# Patient Record
Sex: MALE | Race: WHITE | NOT HISPANIC OR LATINO
[De-identification: names, ages, dates, MRNs, and addresses within clinical notes are randomized per-mention and may not be internally consistent; named-entity substitution may affect disease eponyms.]

---

## 2016-05-11 RX ORDER — GABAPENTIN 400 MG/1
1 CAPSULE ORAL
Qty: 0 | Refills: 0 | COMMUNITY
Start: 2016-05-11 | End: 2016-06-10

## 2017-01-04 ENCOUNTER — CLINICAL ADVICE (OUTPATIENT)
Age: 82
End: 2017-01-04

## 2017-01-05 ENCOUNTER — LABORATORY RESULT (OUTPATIENT)
Age: 82
End: 2017-01-05

## 2017-01-06 LAB
APPEARANCE: ABNORMAL
BILIRUBIN URINE: NEGATIVE
BLOOD URINE: ABNORMAL
COLOR: ABNORMAL
GLUCOSE QUALITATIVE U: NORMAL
KETONES URINE: NEGATIVE
LEUKOCYTE ESTERASE URINE: ABNORMAL
NITRITE URINE: NEGATIVE
PH URINE: 6
PROTEIN URINE: 300
SPECIFIC GRAVITY URINE: 1.03
UROBILINOGEN URINE: NORMAL

## 2017-01-09 ENCOUNTER — MEDICATION RENEWAL (OUTPATIENT)
Age: 82
End: 2017-01-09

## 2017-01-10 ENCOUNTER — APPOINTMENT (OUTPATIENT)
Dept: HOME HEALTH SERVICES | Facility: HOME HEALTH | Age: 82
End: 2017-01-10

## 2017-01-10 VITALS
DIASTOLIC BLOOD PRESSURE: 60 MMHG | SYSTOLIC BLOOD PRESSURE: 140 MMHG | HEART RATE: 72 BPM | RESPIRATION RATE: 16 BRPM | OXYGEN SATURATION: 94 % | TEMPERATURE: 97.8 F

## 2017-02-06 ENCOUNTER — APPOINTMENT (OUTPATIENT)
Dept: HOME HEALTH SERVICES | Facility: HOME HEALTH | Age: 82
End: 2017-02-06

## 2017-02-08 VITALS
TEMPERATURE: 97.4 F | HEART RATE: 72 BPM | RESPIRATION RATE: 16 BRPM | DIASTOLIC BLOOD PRESSURE: 70 MMHG | OXYGEN SATURATION: 96 % | SYSTOLIC BLOOD PRESSURE: 110 MMHG

## 2017-02-17 ENCOUNTER — APPOINTMENT (OUTPATIENT)
Dept: HOME HEALTH SERVICES | Facility: HOME HEALTH | Age: 82
End: 2017-02-17

## 2017-02-17 VITALS
OXYGEN SATURATION: 96 % | HEART RATE: 60 BPM | DIASTOLIC BLOOD PRESSURE: 70 MMHG | TEMPERATURE: 96.5 F | RESPIRATION RATE: 16 BRPM | SYSTOLIC BLOOD PRESSURE: 125 MMHG

## 2017-02-17 DIAGNOSIS — Z86.69 PERSONAL HISTORY OF OTHER DISEASES OF THE NERVOUS SYSTEM AND SENSE ORGANS: ICD-10-CM

## 2017-03-03 ENCOUNTER — LABORATORY RESULT (OUTPATIENT)
Age: 82
End: 2017-03-03

## 2017-03-03 ENCOUNTER — CLINICAL ADVICE (OUTPATIENT)
Age: 82
End: 2017-03-03

## 2017-03-06 ENCOUNTER — CLINICAL ADVICE (OUTPATIENT)
Age: 82
End: 2017-03-06

## 2017-03-13 ENCOUNTER — APPOINTMENT (OUTPATIENT)
Dept: HOME HEALTH SERVICES | Facility: HOME HEALTH | Age: 82
End: 2017-03-13

## 2017-03-13 VITALS
HEART RATE: 65 BPM | SYSTOLIC BLOOD PRESSURE: 120 MMHG | DIASTOLIC BLOOD PRESSURE: 70 MMHG | TEMPERATURE: 97.5 F | OXYGEN SATURATION: 97 % | RESPIRATION RATE: 16 BRPM

## 2017-03-29 ENCOUNTER — EMERGENCY (EMERGENCY)
Facility: HOSPITAL | Age: 82
LOS: 1 days | Discharge: HOME | End: 2017-03-29
Attending: EMERGENCY MEDICINE | Admitting: EMERGENCY MEDICINE
Payer: MEDICARE

## 2017-03-29 VITALS
OXYGEN SATURATION: 98 % | RESPIRATION RATE: 19 BRPM | HEART RATE: 98 BPM | DIASTOLIC BLOOD PRESSURE: 80 MMHG | SYSTOLIC BLOOD PRESSURE: 165 MMHG

## 2017-03-29 DIAGNOSIS — Z98.89 OTHER SPECIFIED POSTPROCEDURAL STATES: Chronic | ICD-10-CM

## 2017-03-29 DIAGNOSIS — Z95.0 PRESENCE OF CARDIAC PACEMAKER: Chronic | ICD-10-CM

## 2017-03-29 LAB
APTT BLD: 31.6 SEC — SIGNIFICANT CHANGE UP (ref 27.5–37.4)
BASOPHILS # BLD AUTO: 0.02 K/UL — SIGNIFICANT CHANGE UP (ref 0–0.2)
BASOPHILS NFR BLD AUTO: 0.2 % — SIGNIFICANT CHANGE UP (ref 0–2)
BUN SERPL-MCNC: 15 MG/DL — SIGNIFICANT CHANGE UP (ref 7–23)
CALCIUM SERPL-MCNC: 9.1 MG/DL — SIGNIFICANT CHANGE UP (ref 8.4–10.5)
CHLORIDE SERPL-SCNC: 96 MMOL/L — LOW (ref 98–107)
CO2 SERPL-SCNC: 25 MMOL/L — SIGNIFICANT CHANGE UP (ref 22–31)
CREAT SERPL-MCNC: 0.66 MG/DL — SIGNIFICANT CHANGE UP (ref 0.5–1.3)
EOSINOPHIL # BLD AUTO: 0.16 K/UL — SIGNIFICANT CHANGE UP (ref 0–0.5)
EOSINOPHIL NFR BLD AUTO: 1.7 % — SIGNIFICANT CHANGE UP (ref 0–6)
GLUCOSE SERPL-MCNC: 118 MG/DL — HIGH (ref 70–99)
HCT VFR BLD CALC: 41.4 % — SIGNIFICANT CHANGE UP (ref 39–50)
HGB BLD-MCNC: 13.6 G/DL — SIGNIFICANT CHANGE UP (ref 13–17)
IMM GRANULOCYTES NFR BLD AUTO: 2 % — HIGH (ref 0–1.5)
INR BLD: 1.1 — SIGNIFICANT CHANGE UP (ref 0.88–1.17)
LYMPHOCYTES # BLD AUTO: 2.07 K/UL — SIGNIFICANT CHANGE UP (ref 1–3.3)
LYMPHOCYTES # BLD AUTO: 21.8 % — SIGNIFICANT CHANGE UP (ref 13–44)
MCHC RBC-ENTMCNC: 29.3 PG — SIGNIFICANT CHANGE UP (ref 27–34)
MCHC RBC-ENTMCNC: 32.9 % — SIGNIFICANT CHANGE UP (ref 32–36)
MCV RBC AUTO: 89.2 FL — SIGNIFICANT CHANGE UP (ref 80–100)
MONOCYTES # BLD AUTO: 1.04 K/UL — HIGH (ref 0–0.9)
MONOCYTES NFR BLD AUTO: 10.9 % — SIGNIFICANT CHANGE UP (ref 2–14)
NEUTROPHILS # BLD AUTO: 6.03 K/UL — SIGNIFICANT CHANGE UP (ref 1.8–7.4)
NEUTROPHILS NFR BLD AUTO: 63.4 % — SIGNIFICANT CHANGE UP (ref 43–77)
PLATELET # BLD AUTO: 115 K/UL — LOW (ref 150–400)
PMV BLD: 9.9 FL — SIGNIFICANT CHANGE UP (ref 7–13)
POTASSIUM SERPL-MCNC: 4.9 MMOL/L — SIGNIFICANT CHANGE UP (ref 3.5–5.3)
POTASSIUM SERPL-SCNC: 4.9 MMOL/L — SIGNIFICANT CHANGE UP (ref 3.5–5.3)
PROTHROM AB SERPL-ACNC: 12.3 SEC — SIGNIFICANT CHANGE UP (ref 9.8–13.1)
RBC # BLD: 4.64 M/UL — SIGNIFICANT CHANGE UP (ref 4.2–5.8)
RBC # FLD: 15.1 % — HIGH (ref 10.3–14.5)
SODIUM SERPL-SCNC: 136 MMOL/L — SIGNIFICANT CHANGE UP (ref 135–145)
WBC # BLD: 9.51 K/UL — SIGNIFICANT CHANGE UP (ref 3.8–10.5)
WBC # FLD AUTO: 9.51 K/UL — SIGNIFICANT CHANGE UP (ref 3.8–10.5)

## 2017-03-29 PROCEDURE — 99283 EMERGENCY DEPT VISIT LOW MDM: CPT | Mod: 25,GC

## 2017-03-29 PROCEDURE — 30901 CONTROL OF NOSEBLEED: CPT | Mod: LT

## 2017-03-29 NOTE — ED PROVIDER NOTE - OBJECTIVE STATEMENT
86yo male with multiple medical problems including Aortic Stenosis, ICH s/p craniotomy with resultant seizures, controlled on depakote, recurrent c. diff, chronic indwelling arauz presents with epistaxis. Per daughter, patient had epistaxis last night which they placed packing in this am the packing had small amt of blood on it and bleeding had stopped. Pt about 1.5 hours prior to presentation with epistaxis with clots and coughing some up, unable to stop it. Patient denies abd pain, n/v/d. No fevers at home. Increased urinary sediment with decreased output in arauz per daughter. States his BP has been fluctuating from lows of systolics 90s to 180s past couple of days 86yo male with multiple medical problems including Aortic Stenosis, ICH s/p craniotomy with resultant seizures, controlled on depakote, recurrent c. diff, chronic indwelling arauz presents with epistaxis. Per daughter, patient had epistaxis last night which they placed packing in this am the packing had small amt of blood on it and bleeding had stopped. Pt about 1.5 hours prior to presentation with epistaxis with clots and coughing some up, unable to stop it. Patient denies abd pain, n/v/d. No fevers at home. Increased urinary sediment with decreased output in arauz per daughter. States his BP has been fluctuating from lows of systolics 90s to 180s past couple of days.   85M hx cva, ppm, on baby asa daily p/w epistaxis x 3 days. 86yo male with multiple medical problems including Aortic Stenosis, ICH s/p craniotomy with resultant seizures, controlled on depakote, recurrent c. diff, chronic indwelling arauz presents with epistaxis. Per daughter, patient had epistaxis last night which they placed packing in this am the packing had small amt of blood on it and bleeding had stopped. Pt about 1.5 hours prior to presentation with epistaxis with clots and coughing some up, unable to stop it. Patient denies abd pain, n/v/d. No fevers at home. Increased urinary sediment with decreased output in arauz per daughter. States his BP has been fluctuating from lows of systolics 90s to 180s past couple of days.   85M hx cva, ppm, on baby asa daily p/w epistaxis x 3 days. Patient aaox1, 84yo male with multiple medical problems including Aortic Stenosis, ICH s/p craniotomy with resultant seizures, controlled on depakote, recurrent c. diff, chronic indwelling arauz presents with epistaxis. Per daughter, patient had epistaxis last night which they placed packing in this am the packing had small amt of blood on it and bleeding had stopped. Pt about 1.5 hours prior to presentation with epistaxis with clots and coughing some up, unable to stop it. Patient denies abd pain, n/v/d. No fevers at home. Increased urinary sediment with decreased output in arauz per daughter. States his BP has been fluctuating from lows of systolics 90s to 180s past couple of days.   85M hx cva, ppm, on baby asa daily p/w epistaxis x 3 days. Patient aaox1, hpi as per daughter at bedside. Past three days intermitt epistaxis, improves with tamponade. Today continuous epistaxis despite tamponade, coughing up blood, neg sob neg wheeze. Daughter notes patient has had multiple epistaxis in past, has had rhinorockets had had arauz. Denies loc, lighthead, change in mental status. 86yo male with multiple medical problems including Aortic Stenosis, ICH s/p craniotomy with resultant seizures, controlled on depakote, recurrent c. diff, chronic indwelling arauz presents with epistaxis. Per daughter, patient had epistaxis last night which they placed packing in this am the packing had small amt of blood on it and bleeding had stopped. Pt about 1.5 hours prior to presentation with epistaxis with clots and coughing some up, unable to stop it. Patient denies abd pain, n/v/d. No fevers at home. Increased urinary sediment with decreased output in arauz per daughter. States his BP has been fluctuating from lows of systolics 90s to 180s past couple of days.   Jane attM hx cva, ppm, on baby asa daily p/w epistaxis x 3 days. Patient aaox1, hpi as per daughter at bedside. Past three days intermitt epistaxis, improves with tamponade. Today continuous epistaxis despite tamponade, coughing up blood, neg sob neg wheeze. Daughter notes patient has had multiple epistaxis in past, has had rhinorockets had had arauz. Denies loc, lighthead, change in mental status.

## 2017-03-29 NOTE — ED ADULT NURSE NOTE - OBJECTIVE STATEMENT
85 years old male presents with complaints of epistaxis that started today after leaning forward. Patient has dimilar prior episodes in the past. Patient only takes aspirin. Denies fever , chills, dizziness, syncope. On arrival, patient is bought in by ambulance accompanied by daughter, alert and oriented x 3, PERRLA, moderate blood noted coming from nosehard of hearing, lungs are clear bilaterally, S1, S2 regular, abdomen soft and nontender, no edema noted skin is intact. 20 gauge saline lock placed on left metacarpal by EMT. Blood drawn and sent. Will follow up and monitor. 85 years old male presents with complaints of epistaxis that started today after leaning forward. Patient has dimilar prior episodes in the past. Patient only takes aspirin. Denies fever , chills, dizziness, syncope. On arrival, patient is bought in by ambulance accompanied by daughter, alert and oriented x 3, PERRLA, moderate blood noted coming from nosehard of hearing, lungs are clear bilaterally, S1, S2 regular, abdomen soft and nontender, arauz 14 Fr in place, draining yellow urine, no edema noted skin is intact. 20 gauge saline lock placed on left metacarpal by EMT. Blood drawn and sent. Will follow up and monitor.

## 2017-03-29 NOTE — ED PROVIDER NOTE - PHYSICAL EXAMINATION
Jane att: elderly male, nad, aaox1-2, perrl, ctabl, rrr, s1s2, abd soft ntnd, oropharynx clear mallampati 1, lt ant nare vis bleed, neg septal hematoma

## 2017-03-29 NOTE — ED PROVIDER NOTE - PMH
Afib    Aortic stenosis    Hemorrhagic stroke  1990s  HTN (hypertension)    Hypothyroid    Seizure disorder    Urinary retention

## 2017-03-29 NOTE — ED ADULT TRIAGE NOTE - CHIEF COMPLAINT QUOTE
states" Nose bleed on and off since Monday and getting worst now, patient is not on any blood thinners".

## 2017-03-30 ENCOUNTER — APPOINTMENT (OUTPATIENT)
Age: 82
End: 2017-03-30

## 2017-03-30 VITALS
SYSTOLIC BLOOD PRESSURE: 132 MMHG | TEMPERATURE: 97 F | HEART RATE: 74 BPM | OXYGEN SATURATION: 96 % | DIASTOLIC BLOOD PRESSURE: 70 MMHG | RESPIRATION RATE: 16 BRPM

## 2017-03-30 RX ORDER — NITROFURANTOIN (MONOHYDRATE/MACROCRYSTALS) 25; 75 MG/1; MG/1
100 CAPSULE ORAL
Qty: 14 | Refills: 0 | Status: COMPLETED | COMMUNITY
Start: 2017-01-09 | End: 2017-03-30

## 2017-04-10 ENCOUNTER — APPOINTMENT (OUTPATIENT)
Dept: HOME HEALTH SERVICES | Facility: HOME HEALTH | Age: 82
End: 2017-04-10

## 2017-04-10 VITALS
SYSTOLIC BLOOD PRESSURE: 118 MMHG | HEART RATE: 67 BPM | OXYGEN SATURATION: 96 % | DIASTOLIC BLOOD PRESSURE: 80 MMHG | RESPIRATION RATE: 16 BRPM | TEMPERATURE: 98.1 F

## 2017-04-20 ENCOUNTER — MEDICATION RENEWAL (OUTPATIENT)
Age: 82
End: 2017-04-20

## 2017-04-26 ENCOUNTER — MEDICATION RENEWAL (OUTPATIENT)
Age: 82
End: 2017-04-26

## 2017-04-26 RX ORDER — AZITHROMYCIN 500 MG/1
500 TABLET, FILM COATED ORAL DAILY
Qty: 3 | Refills: 0 | Status: DISCONTINUED | COMMUNITY
Start: 2017-04-03 | End: 2017-04-26

## 2017-04-27 ENCOUNTER — LABORATORY RESULT (OUTPATIENT)
Age: 82
End: 2017-04-27

## 2017-04-28 ENCOUNTER — CLINICAL ADVICE (OUTPATIENT)
Age: 82
End: 2017-04-28

## 2017-04-28 ENCOUNTER — MEDICATION RENEWAL (OUTPATIENT)
Age: 82
End: 2017-04-28

## 2017-04-30 LAB
C DIFF TOX GENS STL QL NAA+PROBE: NORMAL
CDIFF BY PCR: DETECTED

## 2017-05-01 ENCOUNTER — CLINICAL ADVICE (OUTPATIENT)
Age: 82
End: 2017-05-01

## 2017-05-01 ENCOUNTER — RX RENEWAL (OUTPATIENT)
Age: 82
End: 2017-05-01

## 2017-05-01 LAB — BACTERIA STL CULT: NORMAL

## 2017-05-03 LAB
APPEARANCE: ABNORMAL
BILIRUBIN URINE: NEGATIVE
BLOOD URINE: ABNORMAL
COLOR: YELLOW
GLUCOSE QUALITATIVE U: NORMAL
KETONES URINE: NEGATIVE
LEUKOCYTE ESTERASE URINE: ABNORMAL
NITRITE URINE: POSITIVE
PH URINE: 5.5
PROTEIN URINE: NEGATIVE
SPECIFIC GRAVITY URINE: 1.01
UROBILINOGEN URINE: NORMAL

## 2017-05-16 ENCOUNTER — APPOINTMENT (OUTPATIENT)
Dept: HOME HEALTH SERVICES | Facility: HOME HEALTH | Age: 82
End: 2017-05-16

## 2017-05-16 VITALS
HEART RATE: 70 BPM | SYSTOLIC BLOOD PRESSURE: 140 MMHG | OXYGEN SATURATION: 94 % | DIASTOLIC BLOOD PRESSURE: 70 MMHG | RESPIRATION RATE: 16 BRPM | TEMPERATURE: 98 F

## 2017-05-17 RX ORDER — METRONIDAZOLE 500 MG/1
500 TABLET ORAL 3 TIMES DAILY
Qty: 42 | Refills: 0 | Status: DISCONTINUED | COMMUNITY
Start: 2017-04-28 | End: 2017-04-28

## 2017-05-30 ENCOUNTER — MEDICATION RENEWAL (OUTPATIENT)
Age: 82
End: 2017-05-30

## 2017-06-03 ENCOUNTER — RX RENEWAL (OUTPATIENT)
Age: 82
End: 2017-06-03

## 2017-06-06 ENCOUNTER — APPOINTMENT (OUTPATIENT)
Dept: HOME HEALTH SERVICES | Facility: HOME HEALTH | Age: 82
End: 2017-06-06

## 2017-06-06 VITALS
OXYGEN SATURATION: 93 % | TEMPERATURE: 97.6 F | RESPIRATION RATE: 14 BRPM | DIASTOLIC BLOOD PRESSURE: 90 MMHG | SYSTOLIC BLOOD PRESSURE: 162 MMHG | HEART RATE: 66 BPM

## 2017-06-06 DIAGNOSIS — L60.0 INGROWING NAIL: ICD-10-CM

## 2017-06-06 DIAGNOSIS — N39.0 URINARY TRACT INFECTION, SITE NOT SPECIFIED: ICD-10-CM

## 2017-06-06 DIAGNOSIS — M70.21 OLECRANON BURSITIS, RIGHT ELBOW: ICD-10-CM

## 2017-06-06 RX ORDER — CEFDINIR 300 MG/1
300 CAPSULE ORAL
Qty: 14 | Refills: 0 | Status: COMPLETED | COMMUNITY
Start: 2017-05-01 | End: 2017-06-06

## 2017-06-07 ENCOUNTER — LABORATORY RESULT (OUTPATIENT)
Age: 82
End: 2017-06-07

## 2017-06-13 LAB
ALBUMIN SERPL ELPH-MCNC: 3.2 G/DL
ANION GAP SERPL CALC-SCNC: 16 MMOL/L
BASOPHILS # BLD AUTO: 0.02 K/UL
BASOPHILS NFR BLD AUTO: 0.2 %
BUN SERPL-MCNC: 12 MG/DL
CALCIUM SERPL-MCNC: 9.2 MG/DL
CHLORIDE SERPL-SCNC: 97 MMOL/L
CO2 SERPL-SCNC: 25 MMOL/L
CREAT SERPL-MCNC: 0.87 MG/DL
EOSINOPHIL # BLD AUTO: 0.12 K/UL
EOSINOPHIL NFR BLD AUTO: 1.2 %
GLUCOSE SERPL-MCNC: 73 MG/DL
HCT VFR BLD CALC: 42.9 %
HGB BLD-MCNC: 13.7 G/DL
IMM GRANULOCYTES NFR BLD AUTO: 0.9 %
LYMPHOCYTES # BLD AUTO: 2.19 K/UL
LYMPHOCYTES NFR BLD AUTO: 21.9 %
MAN DIFF?: NORMAL
MCHC RBC-ENTMCNC: 29.1 PG
MCHC RBC-ENTMCNC: 31.9 GM/DL
MCV RBC AUTO: 91.1 FL
MONOCYTES # BLD AUTO: 1.34 K/UL
MONOCYTES NFR BLD AUTO: 13.4 %
NEUTROPHILS # BLD AUTO: 6.25 K/UL
NEUTROPHILS NFR BLD AUTO: 62.4 %
NT-PROBNP SERPL-MCNC: 1978 PG/ML
PHOSPHATE SERPL-MCNC: 3 MG/DL
PLATELET # BLD AUTO: 95 K/UL
POTASSIUM SERPL-SCNC: 3.8 MMOL/L
RBC # BLD: 4.71 M/UL
RBC # FLD: 15.5 %
SODIUM SERPL-SCNC: 138 MMOL/L
WBC # FLD AUTO: 10.01 K/UL

## 2017-06-19 ENCOUNTER — RX RENEWAL (OUTPATIENT)
Age: 82
End: 2017-06-19

## 2017-06-21 ENCOUNTER — MEDICATION RENEWAL (OUTPATIENT)
Age: 82
End: 2017-06-21

## 2017-06-30 ENCOUNTER — APPOINTMENT (OUTPATIENT)
Dept: HOME HEALTH SERVICES | Facility: HOME HEALTH | Age: 82
End: 2017-06-30

## 2017-08-01 ENCOUNTER — CLINICAL ADVICE (OUTPATIENT)
Age: 82
End: 2017-08-01

## 2017-08-08 ENCOUNTER — MEDICATION RENEWAL (OUTPATIENT)
Age: 82
End: 2017-08-08

## 2017-08-18 ENCOUNTER — APPOINTMENT (OUTPATIENT)
Dept: HOME HEALTH SERVICES | Facility: HOME HEALTH | Age: 82
End: 2017-08-18

## 2017-08-18 VITALS
TEMPERATURE: 98.4 F | HEART RATE: 82 BPM | SYSTOLIC BLOOD PRESSURE: 124 MMHG | DIASTOLIC BLOOD PRESSURE: 70 MMHG | OXYGEN SATURATION: 95 %

## 2017-08-21 ENCOUNTER — MEDICATION RENEWAL (OUTPATIENT)
Age: 82
End: 2017-08-21

## 2017-08-28 ENCOUNTER — RX RENEWAL (OUTPATIENT)
Age: 82
End: 2017-08-28

## 2017-08-31 ENCOUNTER — APPOINTMENT (OUTPATIENT)
Dept: HOME HEALTH SERVICES | Facility: HOME HEALTH | Age: 82
End: 2017-08-31

## 2017-09-05 ENCOUNTER — RX RENEWAL (OUTPATIENT)
Age: 82
End: 2017-09-05

## 2017-09-18 ENCOUNTER — APPOINTMENT (OUTPATIENT)
Dept: HOME HEALTH SERVICES | Facility: HOME HEALTH | Age: 82
End: 2017-09-18
Payer: MEDICARE

## 2017-09-18 VITALS
RESPIRATION RATE: 16 BRPM | DIASTOLIC BLOOD PRESSURE: 78 MMHG | SYSTOLIC BLOOD PRESSURE: 126 MMHG | OXYGEN SATURATION: 96 % | HEART RATE: 78 BPM | TEMPERATURE: 97.6 F

## 2017-09-18 DIAGNOSIS — Z87.898 PERSONAL HISTORY OF OTHER SPECIFIED CONDITIONS: ICD-10-CM

## 2017-09-18 DIAGNOSIS — Z71.89 OTHER SPECIFIED COUNSELING: ICD-10-CM

## 2017-09-18 DIAGNOSIS — A04.72 ENTEROCOLITIS DUE TO CLOSTRIDIUM DIFFICILE, NOT SPECIFIED AS RECURRENT: ICD-10-CM

## 2017-09-18 DIAGNOSIS — R13.12 DYSPHAGIA, OROPHARYNGEAL PHASE: ICD-10-CM

## 2017-09-18 PROCEDURE — 99350 HOME/RES VST EST HIGH MDM 60: CPT

## 2017-09-18 RX ORDER — VANCOMYCIN HYDROCHLORIDE 125 MG/1
125 CAPSULE ORAL
Qty: 28 | Refills: 0 | Status: DISCONTINUED | COMMUNITY
Start: 2017-06-06 | End: 2017-09-18

## 2017-09-18 RX ORDER — CHLORHEXIDINE GLUCONATE 4 %
325 (65 FE) LIQUID (ML) TOPICAL TWICE DAILY
Qty: 60 | Refills: 1 | Status: DISCONTINUED | COMMUNITY
Start: 2017-03-30 | End: 2017-09-18

## 2017-09-19 PROBLEM — R13.12 OROPHARYNGEAL DYSPHAGIA: Status: ACTIVE | Noted: 2017-06-06

## 2017-09-25 ENCOUNTER — MEDICATION RENEWAL (OUTPATIENT)
Age: 82
End: 2017-09-25

## 2017-10-23 ENCOUNTER — RX RENEWAL (OUTPATIENT)
Age: 82
End: 2017-10-23

## 2017-10-30 ENCOUNTER — RX RENEWAL (OUTPATIENT)
Age: 82
End: 2017-10-30

## 2017-11-13 ENCOUNTER — APPOINTMENT (OUTPATIENT)
Dept: HOME HEALTH SERVICES | Facility: HOME HEALTH | Age: 82
End: 2017-11-13
Payer: MEDICARE

## 2017-11-13 VITALS
OXYGEN SATURATION: 96 % | DIASTOLIC BLOOD PRESSURE: 70 MMHG | SYSTOLIC BLOOD PRESSURE: 130 MMHG | RESPIRATION RATE: 16 BRPM | HEART RATE: 78 BPM | TEMPERATURE: 97.6 F

## 2017-11-13 DIAGNOSIS — I35.0 NONRHEUMATIC AORTIC (VALVE) STENOSIS: ICD-10-CM

## 2017-11-13 DIAGNOSIS — H61.23 IMPACTED CERUMEN, BILATERAL: ICD-10-CM

## 2017-11-13 PROCEDURE — 90662 IIV NO PRSV INCREASED AG IM: CPT

## 2017-11-13 PROCEDURE — G0008: CPT

## 2017-11-13 PROCEDURE — 99350 HOME/RES VST EST HIGH MDM 60: CPT | Mod: 25

## 2017-11-15 ENCOUNTER — RESULT REVIEW (OUTPATIENT)
Age: 82
End: 2017-11-15

## 2017-11-15 LAB
25(OH)D3 SERPL-MCNC: 34.5 NG/ML
ALBUMIN SERPL ELPH-MCNC: 3.3 G/DL
ALP BLD-CCNC: 56 U/L
ALT SERPL-CCNC: 29 U/L
ANION GAP SERPL CALC-SCNC: 18 MMOL/L
AST SERPL-CCNC: 70 U/L
BASOPHILS # BLD AUTO: 0.05 K/UL
BASOPHILS NFR BLD AUTO: 0.5 %
BILIRUB SERPL-MCNC: 0.4 MG/DL
BUN SERPL-MCNC: 13 MG/DL
CALCIUM SERPL-MCNC: 9.2 MG/DL
CHLORIDE SERPL-SCNC: 93 MMOL/L
CO2 SERPL-SCNC: 24 MMOL/L
CREAT SERPL-MCNC: 0.92 MG/DL
EOSINOPHIL # BLD AUTO: 0.11 K/UL
EOSINOPHIL NFR BLD AUTO: 1.1 %
GLUCOSE SERPL-MCNC: 113 MG/DL
HBA1C MFR BLD HPLC: 5.9 %
HCT VFR BLD CALC: 43.3 %
HGB BLD-MCNC: 14.2 G/DL
IMM GRANULOCYTES NFR BLD AUTO: 1.2 %
LYMPHOCYTES # BLD AUTO: 2.36 K/UL
LYMPHOCYTES NFR BLD AUTO: 24.5 %
MAN DIFF?: NORMAL
MCHC RBC-ENTMCNC: 30.2 PG
MCHC RBC-ENTMCNC: 32.8 GM/DL
MCV RBC AUTO: 92.1 FL
MONOCYTES # BLD AUTO: 1.3 K/UL
MONOCYTES NFR BLD AUTO: 13.5 %
NEUTROPHILS # BLD AUTO: 5.71 K/UL
NEUTROPHILS NFR BLD AUTO: 59.2 %
PLATELET # BLD AUTO: 111 K/UL
POTASSIUM SERPL-SCNC: 4.8 MMOL/L
PREALB SERPL NEPH-MCNC: 24 MG/DL
PROT SERPL-MCNC: 7.1 G/DL
RBC # BLD: 4.7 M/UL
RBC # FLD: 15.6 %
SODIUM SERPL-SCNC: 135 MMOL/L
TSH SERPL-ACNC: 8.17 UIU/ML
WBC # FLD AUTO: 9.65 K/UL

## 2017-11-21 ENCOUNTER — RX RENEWAL (OUTPATIENT)
Age: 82
End: 2017-11-21

## 2017-11-28 ENCOUNTER — LABORATORY RESULT (OUTPATIENT)
Age: 82
End: 2017-11-28

## 2017-11-28 ENCOUNTER — CLINICAL ADVICE (OUTPATIENT)
Age: 82
End: 2017-11-28

## 2017-11-29 ENCOUNTER — CLINICAL ADVICE (OUTPATIENT)
Age: 82
End: 2017-11-29

## 2017-11-29 LAB
APPEARANCE: ABNORMAL
BILIRUBIN URINE: NEGATIVE
BLOOD URINE: ABNORMAL
COLOR: YELLOW
GLUCOSE QUALITATIVE U: NEGATIVE
KETONES URINE: NEGATIVE
LEUKOCYTE ESTERASE URINE: ABNORMAL
NITRITE URINE: POSITIVE
PH URINE: 6.5
PROTEIN URINE: 30
SPECIFIC GRAVITY URINE: 1.01
UROBILINOGEN URINE: NEGATIVE

## 2017-11-30 ENCOUNTER — CLINICAL ADVICE (OUTPATIENT)
Age: 82
End: 2017-11-30

## 2017-12-03 ENCOUNTER — RX RENEWAL (OUTPATIENT)
Age: 82
End: 2017-12-03

## 2017-12-06 ENCOUNTER — MEDICATION RENEWAL (OUTPATIENT)
Age: 82
End: 2017-12-06

## 2017-12-08 ENCOUNTER — RX RENEWAL (OUTPATIENT)
Age: 82
End: 2017-12-08

## 2017-12-26 ENCOUNTER — RX RENEWAL (OUTPATIENT)
Age: 82
End: 2017-12-26

## 2018-01-02 ENCOUNTER — APPOINTMENT (OUTPATIENT)
Dept: HOME HEALTH SERVICES | Facility: HOME HEALTH | Age: 83
End: 2018-01-02
Payer: MEDICARE

## 2018-01-02 VITALS
SYSTOLIC BLOOD PRESSURE: 140 MMHG | HEART RATE: 76 BPM | RESPIRATION RATE: 16 BRPM | OXYGEN SATURATION: 96 % | DIASTOLIC BLOOD PRESSURE: 80 MMHG

## 2018-01-02 DIAGNOSIS — N40.1 BENIGN PROSTATIC HYPERPLASIA WITH LOWER URINARY TRACT SYMPMS: ICD-10-CM

## 2018-01-02 DIAGNOSIS — Z92.89 PERSONAL HISTORY OF OTHER MEDICAL TREATMENT: ICD-10-CM

## 2018-01-02 DIAGNOSIS — L21.9 SEBORRHEIC DERMATITIS, UNSPECIFIED: ICD-10-CM

## 2018-01-02 DIAGNOSIS — Z92.29 PERSONAL HISTORY OF OTHER DRUG THERAPY: ICD-10-CM

## 2018-01-02 DIAGNOSIS — I95.2 HYPOTENSION DUE TO DRUGS: ICD-10-CM

## 2018-01-02 DIAGNOSIS — R04.0 EPISTAXIS: ICD-10-CM

## 2018-01-02 DIAGNOSIS — E03.9 HYPOTHYROIDISM, UNSPECIFIED: ICD-10-CM

## 2018-01-02 DIAGNOSIS — I70.0 ATHEROSCLEROSIS OF AORTA: ICD-10-CM

## 2018-01-02 DIAGNOSIS — L08.9 LOCAL INFECTION OF THE SKIN AND SUBCUTANEOUS TISSUE, UNSPECIFIED: ICD-10-CM

## 2018-01-02 DIAGNOSIS — I48.0 PAROXYSMAL ATRIAL FIBRILLATION: ICD-10-CM

## 2018-01-02 DIAGNOSIS — I50.30 UNSPECIFIED DIASTOLIC (CONGESTIVE) HEART FAILURE: ICD-10-CM

## 2018-01-02 DIAGNOSIS — F03.90 UNSPECIFIED DEMENTIA W/OUT BEHAVIORAL DISTURBANCE: ICD-10-CM

## 2018-01-02 DIAGNOSIS — N13.8 BENIGN PROSTATIC HYPERPLASIA WITH LOWER URINARY TRACT SYMPMS: ICD-10-CM

## 2018-01-02 DIAGNOSIS — I10 ESSENTIAL (PRIMARY) HYPERTENSION: ICD-10-CM

## 2018-01-02 DIAGNOSIS — Z78.9 OTHER SPECIFIED HEALTH STATUS: ICD-10-CM

## 2018-01-02 PROCEDURE — 99350 HOME/RES VST EST HIGH MDM 60: CPT

## 2018-01-02 RX ORDER — SULFAMETHOXAZOLE AND TRIMETHOPRIM 800; 160 MG/1; MG/1
800-160 TABLET ORAL
Qty: 14 | Refills: 1 | Status: DISCONTINUED | COMMUNITY
Start: 2017-03-30 | End: 2018-01-02

## 2018-01-02 RX ORDER — KETOCONAZOLE 20.5 MG/ML
2 SHAMPOO, SUSPENSION TOPICAL
Qty: 1 | Refills: 6 | Status: DISCONTINUED | COMMUNITY
Start: 2018-01-02 | End: 2018-01-02

## 2018-01-04 PROBLEM — I95.2 HYPOTENSION DUE TO DRUGS: Status: ACTIVE | Noted: 2018-01-02

## 2018-01-04 PROBLEM — N40.1 BPH WITH OBSTRUCTION/LOWER URINARY TRACT SYMPTOMS: Status: ACTIVE | Noted: 2018-01-02

## 2018-01-04 PROBLEM — I70.0 AORTIC CALCIFICATION: Status: ACTIVE | Noted: 2017-11-13

## 2018-01-04 PROBLEM — R04.0 EPISTAXIS, RECURRENT: Status: ACTIVE | Noted: 2017-03-30

## 2018-01-04 PROBLEM — Z78.9 PATIENT INCAPABLE OF MAKING INFORMED DECISIONS: Status: ACTIVE | Noted: 2018-01-02

## 2018-01-09 ENCOUNTER — RX RENEWAL (OUTPATIENT)
Age: 83
End: 2018-01-09

## 2018-01-10 ENCOUNTER — CLINICAL ADVICE (OUTPATIENT)
Age: 83
End: 2018-01-10

## 2018-01-10 ENCOUNTER — RX RENEWAL (OUTPATIENT)
Age: 83
End: 2018-01-10

## 2018-01-10 LAB — TSH SERPL-ACNC: 6.03 UIU/ML

## 2018-01-19 ENCOUNTER — RX RENEWAL (OUTPATIENT)
Age: 83
End: 2018-01-19

## 2018-02-08 ENCOUNTER — INPATIENT (INPATIENT)
Facility: HOSPITAL | Age: 83
LOS: 7 days | Discharge: HOME HEALTH | End: 2018-02-16
Attending: INTERNAL MEDICINE | Admitting: INTERNAL MEDICINE
Payer: MEDICARE

## 2018-02-08 VITALS
HEART RATE: 77 BPM | OXYGEN SATURATION: 100 % | RESPIRATION RATE: 22 BRPM | DIASTOLIC BLOOD PRESSURE: 98 MMHG | SYSTOLIC BLOOD PRESSURE: 190 MMHG | TEMPERATURE: 97 F

## 2018-02-08 DIAGNOSIS — R33.9 RETENTION OF URINE, UNSPECIFIED: ICD-10-CM

## 2018-02-08 DIAGNOSIS — I48.0 PAROXYSMAL ATRIAL FIBRILLATION: ICD-10-CM

## 2018-02-08 DIAGNOSIS — Z29.9 ENCOUNTER FOR PROPHYLACTIC MEASURES, UNSPECIFIED: ICD-10-CM

## 2018-02-08 DIAGNOSIS — Z71.89 OTHER SPECIFIED COUNSELING: ICD-10-CM

## 2018-02-08 DIAGNOSIS — G40.909 EPILEPSY, UNSPECIFIED, NOT INTRACTABLE, WITHOUT STATUS EPILEPTICUS: ICD-10-CM

## 2018-02-08 DIAGNOSIS — I35.0 NONRHEUMATIC AORTIC (VALVE) STENOSIS: ICD-10-CM

## 2018-02-08 DIAGNOSIS — I10 ESSENTIAL (PRIMARY) HYPERTENSION: ICD-10-CM

## 2018-02-08 DIAGNOSIS — E03.9 HYPOTHYROIDISM, UNSPECIFIED: ICD-10-CM

## 2018-02-08 DIAGNOSIS — R06.02 SHORTNESS OF BREATH: ICD-10-CM

## 2018-02-08 DIAGNOSIS — Z98.89 OTHER SPECIFIED POSTPROCEDURAL STATES: Chronic | ICD-10-CM

## 2018-02-08 DIAGNOSIS — I50.33 ACUTE ON CHRONIC DIASTOLIC (CONGESTIVE) HEART FAILURE: ICD-10-CM

## 2018-02-08 DIAGNOSIS — Z98.890 OTHER SPECIFIED POSTPROCEDURAL STATES: Chronic | ICD-10-CM

## 2018-02-08 DIAGNOSIS — Z95.0 PRESENCE OF CARDIAC PACEMAKER: Chronic | ICD-10-CM

## 2018-02-08 LAB
ALBUMIN SERPL ELPH-MCNC: 3.6 G/DL — SIGNIFICANT CHANGE UP (ref 3.3–5)
ALP SERPL-CCNC: 67 U/L — SIGNIFICANT CHANGE UP (ref 40–120)
ALT FLD-CCNC: 25 U/L — SIGNIFICANT CHANGE UP (ref 4–41)
APPEARANCE UR: SIGNIFICANT CHANGE UP
APTT BLD: 28.7 SEC — SIGNIFICANT CHANGE UP (ref 27.5–37.4)
AST SERPL-CCNC: 70 U/L — HIGH (ref 4–40)
B PERT DNA SPEC QL NAA+PROBE: SIGNIFICANT CHANGE UP
BACTERIA # UR AUTO: SIGNIFICANT CHANGE UP
BASE EXCESS BLDV CALC-SCNC: 2.9 MMOL/L — SIGNIFICANT CHANGE UP
BASOPHILS # BLD AUTO: 0.07 K/UL — SIGNIFICANT CHANGE UP (ref 0–0.2)
BASOPHILS NFR BLD AUTO: 0.6 % — SIGNIFICANT CHANGE UP (ref 0–2)
BILIRUB SERPL-MCNC: 0.7 MG/DL — SIGNIFICANT CHANGE UP (ref 0.2–1.2)
BILIRUB UR-MCNC: NEGATIVE — SIGNIFICANT CHANGE UP
BLOOD GAS VENOUS - CREATININE: 0.68 MG/DL — SIGNIFICANT CHANGE UP (ref 0.5–1.3)
BLOOD UR QL VISUAL: HIGH
BUN SERPL-MCNC: 18 MG/DL — SIGNIFICANT CHANGE UP (ref 7–23)
C PNEUM DNA SPEC QL NAA+PROBE: NOT DETECTED — SIGNIFICANT CHANGE UP
CALCIUM SERPL-MCNC: 8.9 MG/DL — SIGNIFICANT CHANGE UP (ref 8.4–10.5)
CHLORIDE BLDV-SCNC: 101 MMOL/L — SIGNIFICANT CHANGE UP (ref 96–108)
CHLORIDE SERPL-SCNC: 94 MMOL/L — LOW (ref 98–107)
CK MB BLD-MCNC: 5.41 NG/ML — SIGNIFICANT CHANGE UP (ref 1–6.6)
CK MB BLD-MCNC: 6.39 NG/ML — SIGNIFICANT CHANGE UP (ref 1–6.6)
CK SERPL-CCNC: 115 U/L — SIGNIFICANT CHANGE UP (ref 30–200)
CK SERPL-CCNC: 87 U/L — SIGNIFICANT CHANGE UP (ref 30–200)
CO2 SERPL-SCNC: 26 MMOL/L — SIGNIFICANT CHANGE UP (ref 22–31)
COLOR SPEC: YELLOW — SIGNIFICANT CHANGE UP
CREAT SERPL-MCNC: 0.93 MG/DL — SIGNIFICANT CHANGE UP (ref 0.5–1.3)
EOSINOPHIL # BLD AUTO: 0.05 K/UL — SIGNIFICANT CHANGE UP (ref 0–0.5)
EOSINOPHIL NFR BLD AUTO: 0.4 % — SIGNIFICANT CHANGE UP (ref 0–6)
FLUAV H1 2009 PAND RNA SPEC QL NAA+PROBE: NOT DETECTED — SIGNIFICANT CHANGE UP
FLUAV H1 RNA SPEC QL NAA+PROBE: NOT DETECTED — SIGNIFICANT CHANGE UP
FLUAV H3 RNA SPEC QL NAA+PROBE: NOT DETECTED — SIGNIFICANT CHANGE UP
FLUAV SUBTYP SPEC NAA+PROBE: SIGNIFICANT CHANGE UP
FLUBV RNA SPEC QL NAA+PROBE: NOT DETECTED — SIGNIFICANT CHANGE UP
GAS PNL BLDV: 131 MMOL/L — LOW (ref 136–146)
GLUCOSE BLDV-MCNC: 170 — HIGH (ref 70–99)
GLUCOSE SERPL-MCNC: 164 MG/DL — HIGH (ref 70–99)
GLUCOSE UR-MCNC: NEGATIVE — SIGNIFICANT CHANGE UP
HADV DNA SPEC QL NAA+PROBE: NOT DETECTED — SIGNIFICANT CHANGE UP
HCO3 BLDV-SCNC: 26 MMOL/L — SIGNIFICANT CHANGE UP (ref 20–27)
HCOV 229E RNA SPEC QL NAA+PROBE: NOT DETECTED — SIGNIFICANT CHANGE UP
HCOV HKU1 RNA SPEC QL NAA+PROBE: NOT DETECTED — SIGNIFICANT CHANGE UP
HCOV NL63 RNA SPEC QL NAA+PROBE: NOT DETECTED — SIGNIFICANT CHANGE UP
HCOV OC43 RNA SPEC QL NAA+PROBE: NOT DETECTED — SIGNIFICANT CHANGE UP
HCT VFR BLD CALC: 44.4 % — SIGNIFICANT CHANGE UP (ref 39–50)
HCT VFR BLDV CALC: 44.1 % — SIGNIFICANT CHANGE UP (ref 39–51)
HGB BLD-MCNC: 14.4 G/DL — SIGNIFICANT CHANGE UP (ref 13–17)
HGB BLDV-MCNC: 14.4 G/DL — SIGNIFICANT CHANGE UP (ref 13–17)
HMPV RNA SPEC QL NAA+PROBE: NOT DETECTED — SIGNIFICANT CHANGE UP
HPIV1 RNA SPEC QL NAA+PROBE: NOT DETECTED — SIGNIFICANT CHANGE UP
HPIV2 RNA SPEC QL NAA+PROBE: NOT DETECTED — SIGNIFICANT CHANGE UP
HPIV3 RNA SPEC QL NAA+PROBE: NOT DETECTED — SIGNIFICANT CHANGE UP
HPIV4 RNA SPEC QL NAA+PROBE: NOT DETECTED — SIGNIFICANT CHANGE UP
IMM GRANULOCYTES # BLD AUTO: 0.11 # — SIGNIFICANT CHANGE UP
IMM GRANULOCYTES NFR BLD AUTO: 0.9 % — SIGNIFICANT CHANGE UP (ref 0–1.5)
INR BLD: 1.02 — SIGNIFICANT CHANGE UP (ref 0.88–1.17)
KETONES UR-MCNC: NEGATIVE — SIGNIFICANT CHANGE UP
LACTATE BLDV-MCNC: 3.2 MMOL/L — HIGH (ref 0.5–2)
LEUKOCYTE ESTERASE UR-ACNC: HIGH
LYMPHOCYTES # BLD AUTO: 1.52 K/UL — SIGNIFICANT CHANGE UP (ref 1–3.3)
LYMPHOCYTES # BLD AUTO: 12.7 % — LOW (ref 13–44)
M PNEUMO DNA SPEC QL NAA+PROBE: NOT DETECTED — SIGNIFICANT CHANGE UP
MCHC RBC-ENTMCNC: 30.8 PG — SIGNIFICANT CHANGE UP (ref 27–34)
MCHC RBC-ENTMCNC: 32.4 % — SIGNIFICANT CHANGE UP (ref 32–36)
MCV RBC AUTO: 94.9 FL — SIGNIFICANT CHANGE UP (ref 80–100)
MONOCYTES # BLD AUTO: 1.15 K/UL — HIGH (ref 0–0.9)
MONOCYTES NFR BLD AUTO: 9.6 % — SIGNIFICANT CHANGE UP (ref 2–14)
MUCOUS THREADS # UR AUTO: SIGNIFICANT CHANGE UP
NEUTROPHILS # BLD AUTO: 9.03 K/UL — HIGH (ref 1.8–7.4)
NEUTROPHILS NFR BLD AUTO: 75.8 % — SIGNIFICANT CHANGE UP (ref 43–77)
NITRITE UR-MCNC: NEGATIVE — SIGNIFICANT CHANGE UP
NRBC # FLD: 0 — SIGNIFICANT CHANGE UP
NT-PROBNP SERPL-SCNC: 2159 PG/ML — SIGNIFICANT CHANGE UP
PCO2 BLDV: 51 MMHG — SIGNIFICANT CHANGE UP (ref 41–51)
PH BLDV: 7.36 PH — SIGNIFICANT CHANGE UP (ref 7.32–7.43)
PH UR: 6.5 — SIGNIFICANT CHANGE UP (ref 4.6–8)
PLATELET # BLD AUTO: 105 K/UL — LOW (ref 150–400)
PMV BLD: 10.5 FL — SIGNIFICANT CHANGE UP (ref 7–13)
PO2 BLDV: 60 MMHG — HIGH (ref 35–40)
POTASSIUM BLDV-SCNC: 4.3 MMOL/L — SIGNIFICANT CHANGE UP (ref 3.4–4.5)
POTASSIUM SERPL-MCNC: 5.4 MMOL/L — HIGH (ref 3.5–5.3)
POTASSIUM SERPL-SCNC: 5.4 MMOL/L — HIGH (ref 3.5–5.3)
PROT SERPL-MCNC: 8 G/DL — SIGNIFICANT CHANGE UP (ref 6–8.3)
PROT UR-MCNC: 100 MG/DL — SIGNIFICANT CHANGE UP
PROTHROM AB SERPL-ACNC: 11.7 SEC — SIGNIFICANT CHANGE UP (ref 9.8–13.1)
RBC # BLD: 4.68 M/UL — SIGNIFICANT CHANGE UP (ref 4.2–5.8)
RBC # FLD: 15 % — HIGH (ref 10.3–14.5)
RBC CASTS # UR COMP ASSIST: >50 — HIGH (ref 0–?)
RSV RNA SPEC QL NAA+PROBE: NOT DETECTED — SIGNIFICANT CHANGE UP
RV+EV RNA SPEC QL NAA+PROBE: NOT DETECTED — SIGNIFICANT CHANGE UP
SAO2 % BLDV: 88.7 % — HIGH (ref 60–85)
SODIUM SERPL-SCNC: 135 MMOL/L — SIGNIFICANT CHANGE UP (ref 135–145)
SP GR SPEC: 1.02 — SIGNIFICANT CHANGE UP (ref 1–1.04)
TROPONIN T SERPL-MCNC: 0.06 NG/ML — SIGNIFICANT CHANGE UP (ref 0–0.06)
TROPONIN T SERPL-MCNC: < 0.06 NG/ML — SIGNIFICANT CHANGE UP (ref 0–0.06)
UROBILINOGEN FLD QL: NORMAL MG/DL — SIGNIFICANT CHANGE UP
VALPROATE SERPL-MCNC: 95.8 UG/ML — SIGNIFICANT CHANGE UP (ref 50–100)
WBC # BLD: 11.93 K/UL — HIGH (ref 3.8–10.5)
WBC # FLD AUTO: 11.93 K/UL — HIGH (ref 3.8–10.5)
WBC UR QL: >50 — HIGH (ref 0–?)

## 2018-02-08 PROCEDURE — 71045 X-RAY EXAM CHEST 1 VIEW: CPT | Mod: 26

## 2018-02-08 PROCEDURE — 99497 ADVNCD CARE PLAN 30 MIN: CPT | Mod: 25

## 2018-02-08 PROCEDURE — 72193 CT PELVIS W/DYE: CPT | Mod: 26

## 2018-02-08 PROCEDURE — 99223 1ST HOSP IP/OBS HIGH 75: CPT

## 2018-02-08 RX ORDER — METOPROLOL TARTRATE 50 MG
25 TABLET ORAL
Qty: 0 | Refills: 0 | Status: DISCONTINUED | OUTPATIENT
Start: 2018-02-08 | End: 2018-02-16

## 2018-02-08 RX ORDER — ASCORBIC ACID 60 MG
1 TABLET,CHEWABLE ORAL
Qty: 0 | Refills: 0 | COMMUNITY

## 2018-02-08 RX ORDER — LATANOPROST 0.05 MG/ML
1 SOLUTION/ DROPS OPHTHALMIC; TOPICAL AT BEDTIME
Qty: 0 | Refills: 0 | Status: DISCONTINUED | OUTPATIENT
Start: 2018-02-08 | End: 2018-02-16

## 2018-02-08 RX ORDER — DIVALPROEX SODIUM 500 MG/1
500 TABLET, DELAYED RELEASE ORAL THREE TIMES A DAY
Qty: 0 | Refills: 0 | Status: DISCONTINUED | OUTPATIENT
Start: 2018-02-08 | End: 2018-02-16

## 2018-02-08 RX ORDER — FUROSEMIDE 40 MG
20 TABLET ORAL EVERY 12 HOURS
Qty: 0 | Refills: 0 | Status: DISCONTINUED | OUTPATIENT
Start: 2018-02-08 | End: 2018-02-12

## 2018-02-08 RX ORDER — CHOLECALCIFEROL (VITAMIN D3) 125 MCG
1000 CAPSULE ORAL DAILY
Qty: 0 | Refills: 0 | Status: DISCONTINUED | OUTPATIENT
Start: 2018-02-08 | End: 2018-02-16

## 2018-02-08 RX ORDER — LEVOTHYROXINE SODIUM 125 MCG
1 TABLET ORAL
Qty: 0 | Refills: 0 | COMMUNITY

## 2018-02-08 RX ORDER — DORZOLAMIDE HYDROCHLORIDE TIMOLOL MALEATE 20; 5 MG/ML; MG/ML
1 SOLUTION/ DROPS OPHTHALMIC
Qty: 0 | Refills: 0 | Status: DISCONTINUED | OUTPATIENT
Start: 2018-02-08 | End: 2018-02-16

## 2018-02-08 RX ORDER — SODIUM CHLORIDE 9 MG/ML
250 INJECTION INTRAMUSCULAR; INTRAVENOUS; SUBCUTANEOUS ONCE
Qty: 0 | Refills: 0 | Status: COMPLETED | OUTPATIENT
Start: 2018-02-08 | End: 2018-02-08

## 2018-02-08 RX ORDER — ASPIRIN/CALCIUM CARB/MAGNESIUM 324 MG
81 TABLET ORAL DAILY
Qty: 0 | Refills: 0 | Status: DISCONTINUED | OUTPATIENT
Start: 2018-02-08 | End: 2018-02-16

## 2018-02-08 RX ORDER — LATANOPROST 0.05 MG/ML
1 SOLUTION/ DROPS OPHTHALMIC; TOPICAL
Qty: 0 | Refills: 0 | COMMUNITY

## 2018-02-08 RX ORDER — SACCHAROMYCES BOULARDII 250 MG
1 POWDER IN PACKET (EA) ORAL
Qty: 0 | Refills: 0 | COMMUNITY

## 2018-02-08 RX ORDER — FUROSEMIDE 40 MG
40 TABLET ORAL ONCE
Qty: 0 | Refills: 0 | Status: COMPLETED | OUTPATIENT
Start: 2018-02-08 | End: 2018-02-08

## 2018-02-08 RX ORDER — ASCORBIC ACID 60 MG
500 TABLET,CHEWABLE ORAL DAILY
Qty: 0 | Refills: 0 | Status: DISCONTINUED | OUTPATIENT
Start: 2018-02-08 | End: 2018-02-16

## 2018-02-08 RX ORDER — DIVALPROEX SODIUM 500 MG/1
1 TABLET, DELAYED RELEASE ORAL
Qty: 0 | Refills: 0 | COMMUNITY

## 2018-02-08 RX ORDER — LEVOTHYROXINE SODIUM 125 MCG
112 TABLET ORAL DAILY
Qty: 0 | Refills: 0 | Status: DISCONTINUED | OUTPATIENT
Start: 2018-02-08 | End: 2018-02-16

## 2018-02-08 RX ORDER — GABAPENTIN 400 MG/1
300 CAPSULE ORAL THREE TIMES A DAY
Qty: 0 | Refills: 0 | Status: DISCONTINUED | OUTPATIENT
Start: 2018-02-08 | End: 2018-02-16

## 2018-02-08 RX ORDER — BRIMONIDINE TARTRATE 2 MG/MG
1 SOLUTION/ DROPS OPHTHALMIC
Qty: 0 | Refills: 0 | Status: DISCONTINUED | OUTPATIENT
Start: 2018-02-08 | End: 2018-02-16

## 2018-02-08 RX ORDER — RIVASTIGMINE 4.6 MG/24H
4.5 PATCH, EXTENDED RELEASE TRANSDERMAL
Qty: 0 | Refills: 0 | Status: DISCONTINUED | OUTPATIENT
Start: 2018-02-08 | End: 2018-02-16

## 2018-02-08 RX ORDER — CHOLECALCIFEROL (VITAMIN D3) 125 MCG
1 CAPSULE ORAL
Qty: 0 | Refills: 0 | COMMUNITY

## 2018-02-08 RX ORDER — BRIMONIDINE TARTRATE 2 MG/MG
1 SOLUTION/ DROPS OPHTHALMIC
Qty: 0 | Refills: 0 | COMMUNITY

## 2018-02-08 RX ORDER — MIDAZOLAM HYDROCHLORIDE 1 MG/ML
0.5 INJECTION, SOLUTION INTRAMUSCULAR; INTRAVENOUS ONCE
Qty: 0 | Refills: 0 | Status: DISCONTINUED | OUTPATIENT
Start: 2018-02-08 | End: 2018-02-08

## 2018-02-08 RX ORDER — TAMSULOSIN HYDROCHLORIDE 0.4 MG/1
0.4 CAPSULE ORAL AT BEDTIME
Qty: 0 | Refills: 0 | Status: DISCONTINUED | OUTPATIENT
Start: 2018-02-08 | End: 2018-02-16

## 2018-02-08 RX ADMIN — DIVALPROEX SODIUM 500 MILLIGRAM(S): 500 TABLET, DELAYED RELEASE ORAL at 13:19

## 2018-02-08 RX ADMIN — RIVASTIGMINE 4.5 MILLIGRAM(S): 4.6 PATCH, EXTENDED RELEASE TRANSDERMAL at 19:42

## 2018-02-08 RX ADMIN — Medication 40 MILLIGRAM(S): at 07:10

## 2018-02-08 RX ADMIN — LATANOPROST 1 DROP(S): 0.05 SOLUTION/ DROPS OPHTHALMIC; TOPICAL at 21:49

## 2018-02-08 RX ADMIN — DIVALPROEX SODIUM 500 MILLIGRAM(S): 500 TABLET, DELAYED RELEASE ORAL at 21:50

## 2018-02-08 RX ADMIN — DORZOLAMIDE HYDROCHLORIDE TIMOLOL MALEATE 1 DROP(S): 20; 5 SOLUTION/ DROPS OPHTHALMIC at 21:49

## 2018-02-08 RX ADMIN — Medication 25 MILLIGRAM(S): at 17:50

## 2018-02-08 RX ADMIN — BRIMONIDINE TARTRATE 1 DROP(S): 2 SOLUTION/ DROPS OPHTHALMIC at 21:48

## 2018-02-08 RX ADMIN — TAMSULOSIN HYDROCHLORIDE 0.4 MILLIGRAM(S): 0.4 CAPSULE ORAL at 21:51

## 2018-02-08 RX ADMIN — Medication 20 MILLIGRAM(S): at 17:50

## 2018-02-08 RX ADMIN — Medication 112 MICROGRAM(S): at 15:05

## 2018-02-08 RX ADMIN — MIDAZOLAM HYDROCHLORIDE 0.5 MILLIGRAM(S): 1 INJECTION, SOLUTION INTRAMUSCULAR; INTRAVENOUS at 06:30

## 2018-02-08 RX ADMIN — GABAPENTIN 300 MILLIGRAM(S): 400 CAPSULE ORAL at 21:53

## 2018-02-08 RX ADMIN — GABAPENTIN 300 MILLIGRAM(S): 400 CAPSULE ORAL at 13:53

## 2018-02-08 RX ADMIN — Medication 1000 UNIT(S): at 15:04

## 2018-02-08 RX ADMIN — SODIUM CHLORIDE 250 MILLILITER(S): 9 INJECTION INTRAMUSCULAR; INTRAVENOUS; SUBCUTANEOUS at 10:15

## 2018-02-08 RX ADMIN — Medication 1 TABLET(S): at 15:05

## 2018-02-08 RX ADMIN — Medication 500 MILLIGRAM(S): at 15:04

## 2018-02-08 RX ADMIN — Medication 81 MILLIGRAM(S): at 15:04

## 2018-02-08 NOTE — H&P ADULT - NEGATIVE GASTROINTESTINAL SYMPTOMS
no flatulence/no diarrhea/no vomiting/no change in bowel habits/no nausea/no melena/no constipation/no hematochezia

## 2018-02-08 NOTE — ED ADULT NURSE REASSESSMENT NOTE - NS ED NURSE REASSESS COMMENT FT1
received patient from night RN, a&o2-3, patient c/o pelvic "discomfort",  urine draining clear yellow, v/s stable, CM shows NS, waiting pelvis CT at this time.

## 2018-02-08 NOTE — ED ADULT NURSE REASSESSMENT NOTE - NS ED NURSE REASSESS COMMENT FT1
BiPAP placed, pt settings 10/5/60, tolerating well at this time.  Pt medicated per orders.  Awaiting urine outpt to arauz and CT w/ IV con.  Will CTM closely.

## 2018-02-08 NOTE — ED ADULT NURSE NOTE - OBJECTIVE STATEMENT
Pt rcvd to [facility] c/o difficulty breathing, per daughter pt had increased WOB, diaphoresis, and seemed very restless, abd distended c/o leaking around chronic indwelling arauz catheter tonight. PT is aaox2 (self/situation), [place], bilat hearing aids in place.  Pt received on nonrebreather o2sat 97%, pt trialed to 4L NC and desat to 90%, per dtr does not use home O2.  Pt is clammy, shirt soaked w/ sweat, tachypneic to 30, called for BiPAP.  IVL to L Hand 22g placed by EMS, 2nd IV placed to posterior R Arm #20g, labs/cultures/rvp sent per orders.  ED MD Hobbs at bedside for eval.  Pt rectally afebrile 99.2, skin w/ blanchable redness to sacrum, no breakdown noted.  Severe bipedal edema noted 4+ on L, 2+ on R, per dtr is baseline. Denies cp/sob/abd pain at this time.  Scrotum appearing reddened/inflammed - per daughter is change from yesterday. Noted indwelling 16coude urinary catheter w/ 400cc cloudy yellow urine in bag. DC'd arauz intact, replaced w/ 16 coude catheter, easily passed. ED MD Hobbs performed bedside bladder scan to confirm balloon placement.  Pt daughter & son @ bedside (are HCPs), information in chart.  No specific c/o fevers/chills/cough/viral symptoms at home per family.  Awaiting urine specimen collection and disposition.  Will CTM closely. Pt rcvd to [facility] c/o difficulty breathing, per daughter pt had increased WOB, diaphoresis, and seemed very restless, abd distended c/o leaking around chronic indwelling arauz catheter tonight. PT is aaox2 (self/situation), [place], bilat hearing aids in place.  Pt received on nonrebreather o2sat 97%, pt trialed to 4L NC and desat to 90%, per dtr does not use home O2.  Pt is clammy, shirt soaked w/ sweat, tachypneic to 30, HR AFib (historical) called for BiPAP.  IVL to L Hand 22g placed by EMS, 2nd IV placed to posterior R Arm #20g, labs/cultures/rvp sent per orders.  ED MD Hobbs at bedside for eval.  Pt rectally afebrile 99.2, skin w/ blanchable redness to sacrum, no breakdown noted.  Severe bipedal edema noted 4+ on L, 2+ on R, per dtr is baseline. Denies cp/sob/abd pain at this time.  Scrotum appearing reddened/inflammed - per daughter is change from yesterday. Noted indwelling 16coude urinary catheter w/ 400cc cloudy yellow urine in bag. DC'd arauz intact, replaced w/ 16 coude catheter, easily passed. ED MD Hobbs performed bedside bladder scan to confirm balloon placement.  PMHx AFib/ Pacemaker, HTN, Hypothyroid, Aortic Stenosis, Urinary Retention, Seizure disorder on Depakote. Pt daughter & son @ bedside (are HCPs), information in chart.  No specific c/o fevers/chills/cough/viral symptoms at home per family.  Awaiting urine specimen collection and disposition.  Will CTM closely.

## 2018-02-08 NOTE — ED PROVIDER NOTE - MUSCULOSKELETAL, MLM
bl 1+ pitting edema to lower extremities, Spine appears normal, range of motion is not limited, no muscle or joint tenderness

## 2018-02-08 NOTE — H&P ADULT - PMH
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

## 2018-02-08 NOTE — H&P ADULT - ASSESSMENT
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

## 2018-02-08 NOTE — H&P ADULT - NSHPPHYSICALEXAM_GEN_ALL_CORE
Vital Signs Last 24 Hrs  T(C): 37.3 (08 Feb 2018 06:49), Max: 37.3 (08 Feb 2018 06:49)  T(F): 99.2 (08 Feb 2018 06:49), Max: 99.2 (08 Feb 2018 06:49)  HR: 96 (08 Feb 2018 14:16) (70 - 110)  BP: 146/65 (08 Feb 2018 14:16) (88/56 - 190/98)  BP(mean): --  RR: 24 (08 Feb 2018 14:16) (20 - 30)  SpO2: 96% (08 Feb 2018 14:16) (94% - 100%)

## 2018-02-08 NOTE — ED PROVIDER NOTE - ATTENDING CONTRIBUTION TO CARE
87 y/o M with h/o AF on aspirin, AS, previous ICH with seizure disorder on depakote, chronic indwelling arauz here with difficulty breathing.  History provided by daughter, who reports pt with worsening shortness of breathing overnight tonight.  Pt was previously in his usual state of health until tonight.  No fever, chills, cp, back pain, abd pain, n/v/d.  Daughter also reports arauz was recently changed, pt c/o suprapubic pressure and urinary urgency.  Arauz with decreased output and noted to be leaking around the catheter and new scrotal redness.  Elderly male sitting comfortably in stretcher, awake and alert, nontoxic.  VSS, afeb, tachypneic.  Pt is speaking in short sentences, tachypneic, increased work of breathing and subcostal retractions.  Lungs cta with bibasilar rales to midlung field.  Cards nl S1/S2, RRR, no MRG.  Abd soft ntnd.  Trace bilat pedal edema.  (+)scrotum reddened, no crepitus or palpable fluctuance, no sacral ulcerations.  Plan for bipap, labs, cxr, diurese, admit.

## 2018-02-08 NOTE — ED CLERICAL - NS ED CLERK NOTE PRE-ARRIVAL INFORMATION; ADDITIONAL PRE-ARRIVAL INFORMATION

## 2018-02-08 NOTE — H&P ADULT - HISTORY OF PRESENT ILLNESS
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 Son and daughter at bedside for collateral information, though pt is a good historian. Pt is nearly bedbound but does ambulate with walker and with 2 person assistance. 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 Pt was feeling at his baseline yesterday and even when he went to bed last night. This morning while sleeping, pt woke up feeling very short of breath. Pt was sitting in a reclined position when he was sleeping as he has chronic orthopnea. Pt also admits to having lower extremity edema and dyspnea on exertion during the rare times that he does ambulate. Pt elicits that he has been feeling substantial "pressure" in his suprapubic region. Daughter reports they tried to make him comfortable overnight but he remained restless and short of breath until EMS was called at 4:30AM because she thought he has "wet breath sounds." Pt has a chronic arauz that was changed 7 days ago. He is followed by [facility]. Pt denies fever, chills, recent travel, headache, dizziness, visual deficits, chest pain, palpitations, N/V/D/C, hematochezia, melena, dysuria, hematuria, LOC, syncope. Upon arrival to ED, EKG revealed NSR at 77 bpm with sinus arrhythmia at 77 bpm with sinus arrhythmia, RBBB, LAD. First set of cardiac enzymes negative. CXR showed mild interstitial edema. CT pelvis shows, "No abscess or gas collection in the pelvis. Bilateral hydroceles." WBC 11.93. Platelets 105. Glucose 164. AST 70. ProBNP 2159. UA: Moderate blood, large LE. RVP: Negative. Pt was given Lasix and admitted to telemetry.

## 2018-02-08 NOTE — ED PROVIDER NOTE - MEDICAL DECISION MAKING DETAILS
sudden onset sob, noted to be diaphoretic, tachypneic, denying cp in pt with hx as->acs vs. chf exacerbation vs. fluid overload 2/2 worsened AS. CXR, EKG, CE  suprapubic tenderness, sensation of urinary urgency->ua, change arauz, r/o uti

## 2018-02-08 NOTE — H&P ADULT - RS GEN PE MLT RESP DETAILS PC
no wheezes/no rhonchi/no chest wall tenderness/no intercostal retractions/airway patent/rales/respirations non-labored

## 2018-02-08 NOTE — H&P ADULT - PROBLEM SELECTOR PLAN 9
Discussed advanced directives with patient's son and daughter who are the patient's HCPs, they confirmed the prior MOLST directives where patient is DNR/DNI. They understand their father is chronically ill and would have a poor outcome after aggressive resuscitation measures, and that the patient would not want to go through being coded and intubated on pressors again.    Time of Face to Face Discussion: 22 minutes

## 2018-02-08 NOTE — H&P ADULT - PROBLEM SELECTOR PLAN 1
Admit to telemetry, serial cardiac enzymes, serial EKGs  Acute heart failure likely in the setting of aortic stenosis as echo from 2016 shows normal LV function  Pt appears comfortable on oxygen S/P BIPAP in ED (tolerating O2 at this time)  Start Lasix 20mg IVP BID (pt became hypotensive with 40mg)  Strict I&Os, monitor daily weights, 1500 cc fluid restriction, sodium restriction  Echocardiogram ordered to assess LV and valvular function  Consider cardiology consult  Dietician consult  F/U MD note

## 2018-02-08 NOTE — H&P ADULT - ATTENDING COMMENTS
Agree with PA Note Above, edits made where appropriate, case discussed with PA    Patient seen and examined. This is an 86M being admitted for acute on chronic diastolic CHF, unclear triggering etiology. Patient currently with improved respiratory status, confirmed by family at bedside. No preceding fevers, chills, diarrhea, however patient noted to have URI symptoms (rhinorrhea) in the prior few days. The patient is only complaining now of soreness in buttocks 2/2 uncomfortable stretcher. There was remote history of suprapubic pressure, however now patient without complaint.   VS and PE as above   Labs, imaging, and EKG reviewed  A/P: 86M being admitted for acute on chronic diastolic congestive heart failure  1. Acute on Chronic Diastolic CHF - c/w IV BID Lasix, supplemental oxygen, admit to telemetry. Check Echocardiogram. Unclear etiology, possibly UTI, but will need to follow up cultures. Strict I's and O's, daily weights, and DASH diet. Consider Cardiology consultation   2. Moderate AS - check Echocardiogram to evaluate if there is progression of AS  3. Abnormal Urinalysis - Patient with chronic indwelling arauz, recently changed. Possible UTI however patient is asymptomatic at this point and has extensive history of C.diff from abx use for UTI. Given non-toxic appearance and no symptoms, would hold off for now but have low threshold to begin antibiotics if patient becomes symptomatic/acute change in clinical status  Rest of plan as above   Defer DVT ppx given hx of ICH Agree with PA Note Above, edits made where appropriate, case discussed with PA    Patient seen and examined. This is an 86M being admitted for acute on chronic diastolic CHF, unclear triggering etiology. Patient currently with improved respiratory status, confirmed by family at bedside. No preceding fevers, chills, diarrhea, however patient noted to have URI symptoms (rhinorrhea) in the prior few days. The patient is only complaining now of soreness in buttocks 2/2 uncomfortable stretcher. There was remote history of suprapubic pressure, however now patient without complaint.   VS and PE as above   Labs, imaging, and EKG reviewed  A/P: 86M being admitted for acute on chronic diastolic congestive heart failure  1. Acute on Chronic Diastolic CHF - c/w IV BID Lasix, supplemental oxygen, admit to telemetry. Check Echocardiogram. Unclear etiology, possibly UTI, but will need to follow up cultures. Strict I's and O's, daily weights, and DASH diet. Consider Cardiology consultation   2. Moderate AS - check Echocardiogram to evaluate if there is progression of AS  3. Abnormal Urinalysis - Patient with chronic indwelling arauz, recently changed. Possible UTI however patient is asymptomatic at this point and has extensive history of C.diff from abx use for UTI. Given non-toxic appearance and no symptoms, would hold off for now but have low threshold to begin antibiotics if patient becomes symptomatic/acute change in clinical status  4. Advanced Directives Discussion by me (see above) Face to Face Discussion 22 minutes   Rest of plan as above   Defer DVT ppx given hx of ICH

## 2018-02-08 NOTE — ED ADULT NURSE NOTE - CHIEF COMPLAINT QUOTE
Pt. with c/o difficulty breathing becoming increasing worst over the last two hours,  Pt. with hx of aortic stannosis, afib and pacemaker. Sat% 72%.  Pt. with indwelling arauz cath; changed last Thursday.

## 2018-02-08 NOTE — H&P ADULT - NSHPSOCIALHISTORY_GEN_ALL_CORE
. Lives at home with family (son/daughter).  Ambulates with 2 person assistance with walker.  HHA 7 days/week private hire.  Received flu vaccine and PNA vaccine.  Former smoker. Denies drinking.

## 2018-02-08 NOTE — ED ADULT TRIAGE NOTE - CHIEF COMPLAINT QUOTE
Pt. with c/o difficulty breathing becoming increasing worst over the last two hurt,  Pt. with hx of aortic stannosis, afib and pacemaker. Sat% 72%.  Pt. with indwelling arauz cath; changed last thursday. Pt. with c/o difficulty breathing becoming increasing worst over the last two hours,  Pt. with hx of aortic stannosis, afib and pacemaker. Sat% 72%.  Pt. with indwelling arauz cath; changed last Thursday.

## 2018-02-08 NOTE — ED ADULT NURSE REASSESSMENT NOTE - NS ED NURSE REASSESS COMMENT FT1
patient returns from CT scan, weened off BPAP, on 4L NC o2sat 95%, patient persistently hypotensive, mentating well, denies any complaints, MD Echols aware, 250mL bolus NS, BP higher at 116/57, will re assess and monitor closely.

## 2018-02-08 NOTE — ED PROVIDER NOTE - OBJECTIVE STATEMENT
86yM hx Aortic Stenosis, afib, pacemaker, ICH s/p craniotomy with resultant seizures, controlled on depakote, recurrent c. diff, chronic indwelling arauz (last changed Thurs) p/w sob progressively worsening x 2hrs. 86yM hx Aortic Stenosis, afib, pacemaker, ICH s/p craniotomy with resultant seizures, controlled on depakote, recurrent c. diff, chronic indwelling arauz (last changed Thurs) p/w sob progressively worsening x 2hrs. Per dtr (HCP) pt awoke with sensation of abd fullness/having to urinate. Went to btm and got back into bed and then began c/o sob that quickly worsened. Dtr noted sudden onset of audible wet breath sounds that developed as well during that time. Pt denies cp but endorses sensation of bladder fullness and having to urinate. Per dtr arauz last changed thursday but he was noted to be leaking around arauz today.

## 2018-02-08 NOTE — ED ADULT NURSE REASSESSMENT NOTE - NS ED NURSE REASSESS COMMENT FT1
Per ED MD Hobbs, not to administer IVF for pt lactate, pt is fluid overloaded.  No IV antibiotics ordered at this time, waiting on CT.

## 2018-02-08 NOTE — H&P ADULT - PROBLEM SELECTOR PLAN 2
Pt currently in NSR/sinus tachycardia  Continue to monitor on telemetry  Continue Metoprolol for rate control  Continue ASA for lifelong CVA prevention (pt cannot be on systemic A/C secondary to prior ICH)  CHADS score 5

## 2018-02-08 NOTE — H&P ADULT - NEUROLOGICAL DETAILS
alert and oriented x 3/sensation intact/normal strength/cranial nerves intact/responds to pain/responds to verbal commands

## 2018-02-09 DIAGNOSIS — R06.03 ACUTE RESPIRATORY DISTRESS: ICD-10-CM

## 2018-02-09 LAB
ANISOCYTOSIS BLD QL: SLIGHT — SIGNIFICANT CHANGE UP
BASOPHILS # BLD AUTO: 0.04 K/UL — SIGNIFICANT CHANGE UP (ref 0–0.2)
BASOPHILS NFR BLD AUTO: 0.5 % — SIGNIFICANT CHANGE UP (ref 0–2)
BASOPHILS NFR SPEC: 2 % — SIGNIFICANT CHANGE UP (ref 0–2)
BUN SERPL-MCNC: 19 MG/DL — SIGNIFICANT CHANGE UP (ref 7–23)
CALCIUM SERPL-MCNC: 8.6 MG/DL — SIGNIFICANT CHANGE UP (ref 8.4–10.5)
CHLORIDE SERPL-SCNC: 97 MMOL/L — LOW (ref 98–107)
CHOLEST SERPL-MCNC: 147 MG/DL — SIGNIFICANT CHANGE UP (ref 120–199)
CO2 SERPL-SCNC: 26 MMOL/L — SIGNIFICANT CHANGE UP (ref 22–31)
CREAT SERPL-MCNC: 0.86 MG/DL — SIGNIFICANT CHANGE UP (ref 0.5–1.3)
EOSINOPHIL # BLD AUTO: 0.11 K/UL — SIGNIFICANT CHANGE UP (ref 0–0.5)
EOSINOPHIL NFR BLD AUTO: 1.4 % — SIGNIFICANT CHANGE UP (ref 0–6)
EOSINOPHIL NFR FLD: 0 % — SIGNIFICANT CHANGE UP (ref 0–6)
GLUCOSE SERPL-MCNC: 80 MG/DL — SIGNIFICANT CHANGE UP (ref 70–99)
HBA1C BLD-MCNC: 5.9 % — HIGH (ref 4–5.6)
HCT VFR BLD CALC: 40.4 % — SIGNIFICANT CHANGE UP (ref 39–50)
HDLC SERPL-MCNC: 24 MG/DL — LOW (ref 35–55)
HGB BLD-MCNC: 13.4 G/DL — SIGNIFICANT CHANGE UP (ref 13–17)
IMM GRANULOCYTES # BLD AUTO: 0.06 # — SIGNIFICANT CHANGE UP
IMM GRANULOCYTES NFR BLD AUTO: 0.8 % — SIGNIFICANT CHANGE UP (ref 0–1.5)
LIPID PNL WITH DIRECT LDL SERPL: 95 MG/DL — SIGNIFICANT CHANGE UP
LYMPHOCYTES # BLD AUTO: 2.24 K/UL — SIGNIFICANT CHANGE UP (ref 1–3.3)
LYMPHOCYTES # BLD AUTO: 29.1 % — SIGNIFICANT CHANGE UP (ref 13–44)
LYMPHOCYTES NFR SPEC AUTO: 25 % — SIGNIFICANT CHANGE UP (ref 13–44)
MACROCYTES BLD QL: SLIGHT — SIGNIFICANT CHANGE UP
MAGNESIUM SERPL-MCNC: 2.1 MG/DL — SIGNIFICANT CHANGE UP (ref 1.6–2.6)
MANUAL SMEAR VERIFICATION: SIGNIFICANT CHANGE UP
MCHC RBC-ENTMCNC: 31.5 PG — SIGNIFICANT CHANGE UP (ref 27–34)
MCHC RBC-ENTMCNC: 33.2 % — SIGNIFICANT CHANGE UP (ref 32–36)
MCV RBC AUTO: 94.8 FL — SIGNIFICANT CHANGE UP (ref 80–100)
MONOCYTES # BLD AUTO: 1.08 K/UL — HIGH (ref 0–0.9)
MONOCYTES NFR BLD AUTO: 14 % — SIGNIFICANT CHANGE UP (ref 2–14)
MONOCYTES NFR BLD: 11 % — HIGH (ref 2–9)
NEUTROPHIL AB SER-ACNC: 57 % — SIGNIFICANT CHANGE UP (ref 43–77)
NEUTROPHILS # BLD AUTO: 4.16 K/UL — SIGNIFICANT CHANGE UP (ref 1.8–7.4)
NEUTROPHILS NFR BLD AUTO: 54.2 % — SIGNIFICANT CHANGE UP (ref 43–77)
NEUTS BAND # BLD: 2 % — SIGNIFICANT CHANGE UP (ref 0–6)
NRBC # BLD: 0 /100WBC — SIGNIFICANT CHANGE UP
NRBC # FLD: 0 — SIGNIFICANT CHANGE UP
PLATELET # BLD AUTO: 92 K/UL — LOW (ref 150–400)
PLATELET COUNT - ESTIMATE: SIGNIFICANT CHANGE UP
PMV BLD: 9.9 FL — SIGNIFICANT CHANGE UP (ref 7–13)
POTASSIUM SERPL-MCNC: 4.4 MMOL/L — SIGNIFICANT CHANGE UP (ref 3.5–5.3)
POTASSIUM SERPL-SCNC: 4.4 MMOL/L — SIGNIFICANT CHANGE UP (ref 3.5–5.3)
RBC # BLD: 4.26 M/UL — SIGNIFICANT CHANGE UP (ref 4.2–5.8)
RBC # FLD: 15.2 % — HIGH (ref 10.3–14.5)
SODIUM SERPL-SCNC: 137 MMOL/L — SIGNIFICANT CHANGE UP (ref 135–145)
SPECIMEN SOURCE: SIGNIFICANT CHANGE UP
TRIGL SERPL-MCNC: 136 MG/DL — SIGNIFICANT CHANGE UP (ref 10–149)
TSH SERPL-MCNC: 5.96 UIU/ML — HIGH (ref 0.27–4.2)
VARIANT LYMPHS # BLD: 3 % — SIGNIFICANT CHANGE UP
WBC # BLD: 7.69 K/UL — SIGNIFICANT CHANGE UP (ref 3.8–10.5)
WBC # FLD AUTO: 7.69 K/UL — SIGNIFICANT CHANGE UP (ref 3.8–10.5)

## 2018-02-09 PROCEDURE — 99233 SBSQ HOSP IP/OBS HIGH 50: CPT

## 2018-02-09 RX ADMIN — DORZOLAMIDE HYDROCHLORIDE TIMOLOL MALEATE 1 DROP(S): 20; 5 SOLUTION/ DROPS OPHTHALMIC at 21:21

## 2018-02-09 RX ADMIN — DIVALPROEX SODIUM 500 MILLIGRAM(S): 500 TABLET, DELAYED RELEASE ORAL at 05:54

## 2018-02-09 RX ADMIN — LATANOPROST 1 DROP(S): 0.05 SOLUTION/ DROPS OPHTHALMIC; TOPICAL at 21:22

## 2018-02-09 RX ADMIN — DORZOLAMIDE HYDROCHLORIDE TIMOLOL MALEATE 1 DROP(S): 20; 5 SOLUTION/ DROPS OPHTHALMIC at 05:51

## 2018-02-09 RX ADMIN — Medication 25 MILLIGRAM(S): at 17:07

## 2018-02-09 RX ADMIN — DIVALPROEX SODIUM 500 MILLIGRAM(S): 500 TABLET, DELAYED RELEASE ORAL at 21:23

## 2018-02-09 RX ADMIN — Medication 1000 UNIT(S): at 11:45

## 2018-02-09 RX ADMIN — RIVASTIGMINE 4.5 MILLIGRAM(S): 4.6 PATCH, EXTENDED RELEASE TRANSDERMAL at 05:52

## 2018-02-09 RX ADMIN — Medication 20 MILLIGRAM(S): at 05:53

## 2018-02-09 RX ADMIN — TAMSULOSIN HYDROCHLORIDE 0.4 MILLIGRAM(S): 0.4 CAPSULE ORAL at 21:23

## 2018-02-09 RX ADMIN — GABAPENTIN 300 MILLIGRAM(S): 400 CAPSULE ORAL at 05:53

## 2018-02-09 RX ADMIN — BRIMONIDINE TARTRATE 1 DROP(S): 2 SOLUTION/ DROPS OPHTHALMIC at 21:21

## 2018-02-09 RX ADMIN — Medication 20 MILLIGRAM(S): at 17:07

## 2018-02-09 RX ADMIN — GABAPENTIN 300 MILLIGRAM(S): 400 CAPSULE ORAL at 13:34

## 2018-02-09 RX ADMIN — Medication 1 TABLET(S): at 11:45

## 2018-02-09 RX ADMIN — BRIMONIDINE TARTRATE 1 DROP(S): 2 SOLUTION/ DROPS OPHTHALMIC at 05:50

## 2018-02-09 RX ADMIN — RIVASTIGMINE 4.5 MILLIGRAM(S): 4.6 PATCH, EXTENDED RELEASE TRANSDERMAL at 17:07

## 2018-02-09 RX ADMIN — Medication 500 MILLIGRAM(S): at 11:45

## 2018-02-09 RX ADMIN — GABAPENTIN 300 MILLIGRAM(S): 400 CAPSULE ORAL at 21:23

## 2018-02-09 RX ADMIN — Medication 25 MILLIGRAM(S): at 05:53

## 2018-02-09 RX ADMIN — DIVALPROEX SODIUM 500 MILLIGRAM(S): 500 TABLET, DELAYED RELEASE ORAL at 13:34

## 2018-02-09 RX ADMIN — Medication 81 MILLIGRAM(S): at 11:45

## 2018-02-09 RX ADMIN — Medication 112 MICROGRAM(S): at 05:53

## 2018-02-09 NOTE — PHYSICAL THERAPY INITIAL EVALUATION ADULT - GENERAL OBSERVATIONS, REHAB EVAL
Patient received supine in bed, NAD, +NC, +tele, +arauz, son present. Patient agreed to EVALUATION from Physical Therapist.

## 2018-02-09 NOTE — PROGRESS NOTE ADULT - PROBLEM SELECTOR PLAN 4
Continue antihypertensives  Monitor BP serially  Sodium restriction previous TTE with moderate AS  Await repeat TTE  to assess progression of AS

## 2018-02-09 NOTE — PROGRESS NOTE ADULT - PROBLEM SELECTOR PLAN 3
Echocardiogram ordered to assess degree of AS CHADS score 5   Tele- Afib in 80-90s   Continue Metoprolol 25 BID for rate control  Continue ASA for lifelong CVA prevention (pt cannot be on systemic A/C secondary to prior ICH)

## 2018-02-09 NOTE — CONSULT NOTE ADULT - SUBJECTIVE AND OBJECTIVE BOX
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 Son and daughter at bedside for collateral information, though pt is a good historian. Pt is nearly bedbound but does ambulate with walker and with 2 person assistance. Mr. Kinsey woke up feeling very short of breath. His symptoms are associated with lower extremity edema and dyspnea on exertion when he does ambulate.   Currently he feels better.     Allergies    Dilantin (Unknown)  doxycycline (Unknown)  dye (Unknown)  penicillin (Unknown)  Tegretol (Unknown)  trihexyphenidyl (Unknown)  vancomycin (Unknown)    	    MEDICATIONS:  aspirin  chewable 81 milliGRAM(s) Oral daily  furosemide   Injectable 20 milliGRAM(s) IV Push every 12 hours  metoprolol     tartrate 25 milliGRAM(s) Oral two times a day  tamsulosin 0.4 milliGRAM(s) Oral at bedtime        diVALproex  milliGRAM(s) Oral three times a day  gabapentin 300 milliGRAM(s) Oral three times a day  rivastigmine 4.5 milliGRAM(s) Oral two times a day      levothyroxine 112 MICROGram(s) Oral daily    ascorbic acid 500 milliGRAM(s) Oral daily  brimonidine 0.2% Ophthalmic Solution 1 Drop(s) Both EYES two times a day  cholecalciferol 1000 Unit(s) Oral daily  dorzolamide 2%/timolol 0.5% Ophthalmic Solution 1 Drop(s) Both EYES two times a day  latanoprost 0.005% Ophthalmic Solution 1 Drop(s) Both EYES at bedtime  multivitamin 1 Tablet(s) Oral daily      PAST MEDICAL & SURGICAL HISTORY:  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      FAMILY HISTORY:  No pertinent family history in first degree relatives      SOCIAL HISTORY:    Non-smoker    REVIEW OF SYSTEMS:  See HPI, otherwise complete 10 point review of systems negative      PHYSICAL EXAM:  T(C): 36.5 (02-09-18 @ 11:08), Max: 36.9 (02-08-18 @ 19:05)  HR: 80 (02-09-18 @ 17:02) (70 - 89)  BP: 133/78 (02-09-18 @ 17:02) (111/51 - 133/78)  RR: 18 (02-09-18 @ 17:02) (18 - 22)  SpO2: 97% (02-09-18 @ 17:02) (93% - 97%)  Wt(kg): --  I&O's Summary    08 Feb 2018 07:01  -  09 Feb 2018 07:00  --------------------------------------------------------  IN: 0 mL / OUT: 2050 mL / NET: -2050 mL    09 Feb 2018 07:01  -  09 Feb 2018 18:17  --------------------------------------------------------  IN: 0 mL / OUT: 400 mL / NET: -400 mL        Appearance: No Acute Distress	  HEENT:  Normal oral mucosa, PERRL, EOMI	  Cardiovascular: Normal S1 S2, No JVD, No murmurs/rubs/gallops  Respiratory: Lungs clear to auscultation bilaterally  Gastrointestinal:  Soft, Non-tender, + BS	  Skin: No rashes, No ecchymoses, No cyanosis	  Neurologic: Non-focal  Extremities: No clubbing, cyanosis or edema  Vascular: Peripheral pulses palpable 2+ bilaterally  Psychiatry: A & O x 3, Mood & affect appropriate    Laboratory Data:	 	    CBC Full  -  ( 09 Feb 2018 09:54 )  WBC Count : 7.69 K/uL  Hemoglobin : 13.4 g/dL  Hematocrit : 40.4 %  Platelet Count - Automated : 92 K/uL  Mean Cell Volume : 94.8 fL  Mean Cell Hemoglobin : 31.5 pg  Mean Cell Hemoglobin Concentration : 33.2 %  Auto Neutrophil # : 4.16 K/uL  Auto Lymphocyte # : 2.24 K/uL  Auto Monocyte # : 1.08 K/uL  Auto Eosinophil # : 0.11 K/uL  Auto Basophil # : 0.04 K/uL  Auto Neutrophil % : 54.2 %  Auto Lymphocyte % : 29.1 %  Auto Monocyte % : 14.0 %  Auto Eosinophil % : 1.4 %  Auto Basophil % : 0.5 %    02-09    137  |  97<L>  |  19  ----------------------------<  80  4.4   |  26  |  0.86  02-08    135  |  94<L>  |  18  ----------------------------<  164<H>  5.4<H>   |  26  |  0.93    Ca    8.6      09 Feb 2018 06:15  Ca    8.9      08 Feb 2018 06:10  Mg     2.1     02-09    TPro  8.0  /  Alb  3.6  /  TBili  0.7  /  DBili  x   /  AST  70<H>  /  ALT  25  /  AlkPhos  67  02-08      proBNP:   Lipid Profile:   HgA1c: Hemoglobin A1C, Whole Blood: 5.9 % (02-09 @ 06:15)    TSH: Thyroid Stimulating Hormone, Serum: 5.96 uIU/mL (02-09 @ 06:15)    Interpretation of Telemetry: Sinus; no ectopy	    ECG:  Sinus; LAFB; RBBB (bifascicular block new from 2016)	    Assessment: 86 year old man with moderate AS, HTN, and PAF presents with ADHF    Plan of Care:    #Acute on chronic diastolic CHF-  Patient clinically improving, but still fluid overloaded on exam.  Continue current dose of IV lasix.  Check TTE to assess LV function and severity of aortic stenosis.    #Paroxysmal a-fib- currently in sinus.  No AC due to h/o intracranial hemorrhage.     #HTN- BP acceptable on current regimen.    Care discussed with patient.  Will follow with you.     90 minutes spent on total encounter; more than 50% of the visit was spent counseling and/or coordinating care by the attending physician.   	  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 Son and daughter at bedside for collateral information, though pt is a good historian. Pt is nearly bedbound but does ambulate with walker and with 2 person assistance. Mr. Kinsey woke up feeling very short of breath. His symptoms are associated with lower extremity edema and dyspnea on exertion when he does ambulate.   Currently he feels better.     Allergies    Dilantin (Unknown)  doxycycline (Unknown)  dye (Unknown)  penicillin (Unknown)  Tegretol (Unknown)  trihexyphenidyl (Unknown)  vancomycin (Unknown)    	    MEDICATIONS:  aspirin  chewable 81 milliGRAM(s) Oral daily  furosemide   Injectable 20 milliGRAM(s) IV Push every 12 hours  metoprolol     tartrate 25 milliGRAM(s) Oral two times a day  tamsulosin 0.4 milliGRAM(s) Oral at bedtime        diVALproex  milliGRAM(s) Oral three times a day  gabapentin 300 milliGRAM(s) Oral three times a day  rivastigmine 4.5 milliGRAM(s) Oral two times a day      levothyroxine 112 MICROGram(s) Oral daily    ascorbic acid 500 milliGRAM(s) Oral daily  brimonidine 0.2% Ophthalmic Solution 1 Drop(s) Both EYES two times a day  cholecalciferol 1000 Unit(s) Oral daily  dorzolamide 2%/timolol 0.5% Ophthalmic Solution 1 Drop(s) Both EYES two times a day  latanoprost 0.005% Ophthalmic Solution 1 Drop(s) Both EYES at bedtime  multivitamin 1 Tablet(s) Oral daily      PAST MEDICAL & SURGICAL HISTORY:  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      FAMILY HISTORY:  No pertinent family history in first degree relatives      SOCIAL HISTORY:    Non-smoker    REVIEW OF SYSTEMS:  See HPI, otherwise complete 10 point review of systems negative      PHYSICAL EXAM:  T(C): 36.5 (02-09-18 @ 11:08), Max: 36.9 (02-08-18 @ 19:05)  HR: 80 (02-09-18 @ 17:02) (70 - 89)  BP: 133/78 (02-09-18 @ 17:02) (111/51 - 133/78)  RR: 18 (02-09-18 @ 17:02) (18 - 22)  SpO2: 97% (02-09-18 @ 17:02) (93% - 97%)  Wt(kg): --  I&O's Summary    08 Feb 2018 07:01  -  09 Feb 2018 07:00  --------------------------------------------------------  IN: 0 mL / OUT: 2050 mL / NET: -2050 mL    09 Feb 2018 07:01  -  09 Feb 2018 18:17  --------------------------------------------------------  IN: 0 mL / OUT: 400 mL / NET: -400 mL        Appearance: No Acute Distress	  HEENT:  Normal oral mucosa, PERRL, EOMI	  Cardiovascular: Normal S1 S2, No JVD, No murmurs/rubs/gallops  Respiratory: Lungs clear to auscultation bilaterally  Gastrointestinal:  Soft, Non-tender, + BS	  Skin: No rashes, No ecchymoses, No cyanosis	  Neurologic: Non-focal  Extremities: No clubbing, cyanosis or edema  Vascular: Peripheral pulses palpable 2+ bilaterally  Psychiatry: A & O x 3, Mood & affect appropriate    Laboratory Data:	 	    CBC Full  -  ( 09 Feb 2018 09:54 )  WBC Count : 7.69 K/uL  Hemoglobin : 13.4 g/dL  Hematocrit : 40.4 %  Platelet Count - Automated : 92 K/uL  Mean Cell Volume : 94.8 fL  Mean Cell Hemoglobin : 31.5 pg  Mean Cell Hemoglobin Concentration : 33.2 %  Auto Neutrophil # : 4.16 K/uL  Auto Lymphocyte # : 2.24 K/uL  Auto Monocyte # : 1.08 K/uL  Auto Eosinophil # : 0.11 K/uL  Auto Basophil # : 0.04 K/uL  Auto Neutrophil % : 54.2 %  Auto Lymphocyte % : 29.1 %  Auto Monocyte % : 14.0 %  Auto Eosinophil % : 1.4 %  Auto Basophil % : 0.5 %    02-09    137  |  97<L>  |  19  ----------------------------<  80  4.4   |  26  |  0.86  02-08    135  |  94<L>  |  18  ----------------------------<  164<H>  5.4<H>   |  26  |  0.93    Ca    8.6      09 Feb 2018 06:15  Ca    8.9      08 Feb 2018 06:10  Mg     2.1     02-09    TPro  8.0  /  Alb  3.6  /  TBili  0.7  /  DBili  x   /  AST  70<H>  /  ALT  25  /  AlkPhos  67  02-08      proBNP:   Lipid Profile:   HgA1c: Hemoglobin A1C, Whole Blood: 5.9 % (02-09 @ 06:15)    TSH: Thyroid Stimulating Hormone, Serum: 5.96 uIU/mL (02-09 @ 06:15)    Interpretation of Telemetry: Sinus; no ectopy	    ECG:  Sinus; LAFB; RBBB (bifascicular block new from 2016)	    Assessment: 86 year old man with moderate AS, HTN, and PAF presents with ADHF    Plan of Care:    #Acute on chronic diastolic CHF-  Patient clinically improving, but still fluid overloaded on exam.  Continue current dose of IV lasix.  Check TTE to assess LV function and severity of aortic stenosis.    #Paroxysmal a-fib- currently in sinus.  No AC due to h/o intracranial hemorrhage.     #HTN- BP acceptable on current regimen.    Care discussed with patient and daughters at bedside.  Will follow with you.     90 minutes spent on total encounter; more than 50% of the visit was spent counseling and/or coordinating care by the attending physician.   	  Mohsen Whitmore MD [facility]  Cardiovascular Diseases  [phone]

## 2018-02-09 NOTE — PROGRESS NOTE ADULT - SUBJECTIVE AND OBJECTIVE BOX
Patient is a 86y old  Male who presents with a chief complaint of Shortness of breath (2018 13:50)      SUBJECTIVE / OVERNIGHT EVENTS:pt seen and examined by me  Pt ALFREDO  reports breathing a little better but he has to urinate a lot  denies CP/Lightheadness   no acute events     MEDICATIONS  (STANDING):  ascorbic acid 500 milliGRAM(s) Oral daily  aspirin  chewable 81 milliGRAM(s) Oral daily  brimonidine 0.2% Ophthalmic Solution 1 Drop(s) Both EYES two times a day  cholecalciferol 1000 Unit(s) Oral daily  diVALproex  milliGRAM(s) Oral three times a day  dorzolamide 2%/timolol 0.5% Ophthalmic Solution 1 Drop(s) Both EYES two times a day  furosemide   Injectable 20 milliGRAM(s) IV Push every 12 hours  gabapentin 300 milliGRAM(s) Oral three times a day  latanoprost 0.005% Ophthalmic Solution 1 Drop(s) Both EYES at bedtime  levothyroxine 112 MICROGram(s) Oral daily  metoprolol     tartrate 25 milliGRAM(s) Oral two times a day  multivitamin 1 Tablet(s) Oral daily  rivastigmine 4.5 milliGRAM(s) Oral two times a day  tamsulosin 0.4 milliGRAM(s) Oral at bedtime    MEDICATIONS  (PRN):    Vital Signs Last 24 Hrs  T(C): 36.5 (2018 11:08), Max: 36.9 (2018 19:05)  T(F): 97.7 (2018 11:08), Max: 98.4 (2018 19:05)  HR: 70 (2018 11:08) (70 - 97)  BP: 123/63 (2018 11:08) (111/51 - 130/61)  BP(mean): --  RR: 20 (2018 11:08) (18 - 24)  SpO2: 97% (2018 11:08) (93% - 97%)    CAPILLARY BLOOD GLUCOSE        I&O's Summary    2018 07:01  -  2018 07:00  --------------------------------------------------------  IN: 0 mL / OUT: 2050 mL / NET: - mL        PHYSICAL EXAM:  GENERAL: NAD, well-developed  HEAD:  Atraumatic, Normocephalic  EYES: EOMI, PERRLA, conjunctiva and sclera clear  NECK: Supple, No JVD  CHEST/LUNG: Clear to auscultation bilaterally; No wheeze  HEART: Regular rate and rhythm; No murmurs, rubs, or gallops  ABDOMEN: Soft, Nontender, Nondistended; Bowel sounds present  EXTREMITIES:  2+ Peripheral Pulses, No clubbing, cyanosis, or edema  PSYCH: AAOx3  NEUROLOGY: non-focal  SKIN: No rashes or lesions    LABS:                        13.4   7.69  )-----------( 92       ( 2018 09:54 )             40.4     02-09    137  |  97<L>  |  19  ----------------------------<  80  4.4   |  26  |  0.86    Ca    8.6      2018 06:15  Mg     2.1     02-09    TPro  8.0  /  Alb  3.6  /  TBili  0.7  /  DBili  x   /  AST  70<H>  /  ALT  25  /  AlkPhos  67  02-08    PT/INR - ( 2018 06:18 )   PT: 11.7 SEC;   INR: 1.02          PTT - ( 2018 06:18 )  PTT:28.7 SEC  CARDIAC MARKERS ( 2018 12:45 )  x     / 0.06 ng/mL / 87 u/L / 6.39 ng/mL / x      CARDIAC MARKERS ( 2018 06:10 )  x     / < 0.06 ng/mL / 115 u/L / 5.41 ng/mL / x          Urinalysis Basic - ( 2018 06:21 )    Color: YELLOW / Appearance: TURBID / S.023 / pH: 6.5  Gluc: NEGATIVE / Ketone: NEGATIVE  / Bili: NEGATIVE / Urobili: NORMAL mg/dL   Blood: MODERATE / Protein: 100 mg/dL / Nitrite: NEGATIVE   Leuk Esterase: LARGE / RBC: >50 / WBC >50   Sq Epi: x / Non Sq Epi: x / Bacteria: MOD        RADIOLOGY & ADDITIONAL TESTS:    Imaging Personally Reviewed:    Consultant(s) Notes Reviewed:      Care Discussed with Consultants/Other Providers: Patient is a 86y old  Male who presents with a chief complaint of Shortness of breath (2018 13:50)      SUBJECTIVE / OVERNIGHT EVENTS:pt seen and examined by me  Pt ALFREDO  reports breathing a little better but he has to urinate a lot  denies CP/Lightheadness   no acute events     MEDICATIONS  (STANDING):  ascorbic acid 500 milliGRAM(s) Oral daily  aspirin  chewable 81 milliGRAM(s) Oral daily  brimonidine 0.2% Ophthalmic Solution 1 Drop(s) Both EYES two times a day  cholecalciferol 1000 Unit(s) Oral daily  diVALproex  milliGRAM(s) Oral three times a day  dorzolamide 2%/timolol 0.5% Ophthalmic Solution 1 Drop(s) Both EYES two times a day  furosemide   Injectable 20 milliGRAM(s) IV Push every 12 hours  gabapentin 300 milliGRAM(s) Oral three times a day  latanoprost 0.005% Ophthalmic Solution 1 Drop(s) Both EYES at bedtime  levothyroxine 112 MICROGram(s) Oral daily  metoprolol     tartrate 25 milliGRAM(s) Oral two times a day  multivitamin 1 Tablet(s) Oral daily  rivastigmine 4.5 milliGRAM(s) Oral two times a day  tamsulosin 0.4 milliGRAM(s) Oral at bedtime    MEDICATIONS  (PRN):    Vital Signs Last 24 Hrs  T(C): 36.5 (2018 11:08), Max: 36.9 (2018 19:05)  T(F): 97.7 (2018 11:08), Max: 98.4 (2018 19:05)  HR: 70 (2018 11:08) (70 - 97)  BP: 123/63 (2018 11:08) (111/51 - 130/61)  BP(mean): --  RR: 20 (2018 11:08) (18 - 24)  SpO2: 97% (2018 11:08) (93% - 97%)    CAPILLARY BLOOD GLUCOSE        I&O's Summary    2018 07:01  -  2018 07:00  --------------------------------------------------------  IN: 0 mL / OUT: 2050 mL / NET: -2050 mL        PHYSICAL EXAM:  GENERAL: NAD, well-developed  HEAD:  Atraumatic, Normocephalic  EYES: EOMI, PERRLA, conjunctiva and sclera clear  NECK: Supple, No JVD  CHEST/LUNG: Intermittent basilar crackles   HEART: Regular rate and rhythm; No murmurs, rubs, or gallops  ABDOMEN: Soft, Nontender, Nondistended; Bowel sounds present  EXTREMITIES:  2+ Peripheral Pulses, No clubbing, cyanosis. Grade 3 edema present   PSYCH: AAOx1 . pt knew his name but did not know the month or the place   NEUROLOGY: non-focal  SKIN: No rashes or lesions  - Martinez, scrotal edema present     LABS:                        13.4   7.69  )-----------( 92       ( 2018 09:54 )             40.4     02-09    137  |  97<L>  |  19  ----------------------------<  80  4.4   |  26  |  0.86    Ca    8.6      2018 06:15  Mg     2.1     02-09    TPro  8.0  /  Alb  3.6  /  TBili  0.7  /  DBili  x   /  AST  70<H>  /  ALT  25  /  AlkPhos  67  02-08    PT/INR - ( 2018 06:18 )   PT: 11.7 SEC;   INR: 1.02          PTT - ( 2018 06:18 )  PTT:28.7 SEC  CARDIAC MARKERS ( 2018 12:45 )  x     / 0.06 ng/mL / 87 u/L / 6.39 ng/mL / x      CARDIAC MARKERS ( 2018 06:10 )  x     / < 0.06 ng/mL / 115 u/L / 5.41 ng/mL / x          Urinalysis Basic - ( 2018 06:21 )    Color: YELLOW / Appearance: TURBID / S.023 / pH: 6.5  Gluc: NEGATIVE / Ketone: NEGATIVE  / Bili: NEGATIVE / Urobili: NORMAL mg/dL   Blood: MODERATE / Protein: 100 mg/dL / Nitrite: NEGATIVE   Leuk Esterase: LARGE / RBC: >50 / WBC >50   Sq Epi: x / Non Sq Epi: x / Bacteria: MOD        RADIOLOGY & ADDITIONAL TESTS:    Imaging Personally Reviewed:    Consultant(s) Notes Reviewed:      Care Discussed with Consultants/Other Providers:

## 2018-02-09 NOTE — PROGRESS NOTE ADULT - PROBLEM SELECTOR PLAN 7
Continue Martinez and Flomax  Urine cultures ordered Continue Depakote (levels appropriate)  Seizures precautions

## 2018-02-09 NOTE — PROGRESS NOTE ADULT - PROBLEM SELECTOR PLAN 8
ASA only given history of ICH  No further DVT ppx Pt with  urinary retention with chronic indwelling Martinez (changed last week by [facility])  Continue Martinez and Flomax  Urine cultures ordered  Pt with hydrocele on CT- non tender

## 2018-02-09 NOTE — PHYSICAL THERAPY INITIAL EVALUATION ADULT - PERTINENT HX OF CURRENT PROBLEM, REHAB EVAL
Pt. admitted for SOB. PMH of moderate AS, atrial fibrillation, bradycardia S/P [facility]. Carl PPM, hemorrhagic CVA S/P craniotomy complicated by seizure disorder, HTN, hypothyroidism, and urinary retention with chronic indwelling Martinez. Acute on chronic CHF.

## 2018-02-09 NOTE — PROGRESS NOTE ADULT - PROBLEM SELECTOR PLAN 2
Pt currently in NSR/sinus tachycardia  Continue to monitor on telemetry  Continue Metoprolol for rate control  Continue ASA for lifelong CVA prevention (pt cannot be on systemic A/C secondary to prior ICH)  CHADS score 5 likely secondary to HF  s/p BIPAP in ED  Now on supplemental O2  Titrate  O2 to maintain saturation >95%

## 2018-02-09 NOTE — PROGRESS NOTE ADULT - PROBLEM SELECTOR PLAN 6
Continue Depakote (levels appropriate)  Seizures precautions Continue Levothyroxine 112 mcg   TSH levels- 5.96  Repeat TFT in 3-4 weeks

## 2018-02-09 NOTE — PHYSICAL THERAPY INITIAL EVALUATION ADULT - CRITERIA FOR SKILLED THERAPEUTIC INTERVENTIONS
predicted duration of therapy intervention/impairments found/therapy frequency/rehab potential/anticipated discharge recommendation/functional limitations in following categories/risk reduction/prevention

## 2018-02-09 NOTE — PHYSICAL THERAPY INITIAL EVALUATION ADULT - ADDITIONAL COMMENTS
Pt. reports owning DME of rolling walker, wheelchair, commode, hospital bed. Pt. mostly bedbound. Would transfer to bedside commode with 1 person assist and use of rolling walker. Patient's son states on good days pt. is able to ambulate to kitchen with use of rolling walker and 1 person assist. Has HHA 8 hours/day for 7 days.     Pt. was left supine in bed, NAD, all lines and devices intact, call bell within reach, and son present. SUSANNE Gomez aware of pt. participation in PT.

## 2018-02-09 NOTE — PROGRESS NOTE ADULT - PROBLEM SELECTOR PLAN 9
Discussed advanced directives with patient's son and daughter who are the patient's HCPs, they confirmed the prior MOLST directives where patient is DNR/DNI. They understand their father is chronically ill and would have a poor outcome after aggressive resuscitation measures, and that the patient would not want to go through being coded and intubated on pressors again.    Time of Face to Face Discussion: 22 minutes ASA only given history of ICH  No further DVT ppx

## 2018-02-09 NOTE — PROGRESS NOTE ADULT - PROBLEM SELECTOR PLAN 1
likely in the setting of aortic stenosis  TTE from 2016 shows normal LV function ,moderate aortic stenosis, pul HTN  Pt with signs of overload- LE /scrotal edema, Basilar rales   On Lasix 20mg IVP BID (pt became hypotensive with 40mg)  Pt appears comfortable on oxygen S/P BIPAP in ED (tolerating O2 at this time)  Monitor  Strict I&Os, monitor daily weights, 1500 cc fluid restriction, sodium restriction  Echocardiogram ordered to assess LV and valvular function  Consider cardiology consult  Dietician consult  F/U MD note likely in the setting of aortic stenosis  TTE from 2016 shows normal LV function ,moderate aortic stenosis, pul HTN  Pt with signs of overload- LE /scrotal edema, Basilar rales   On Lasix 20mg IVP BID (pt became hypotensive with 40mg)  Pt appears comfortable on oxygen S/P BIPAP in ED (tolerating O2 at this time)  Strict I&Os, monitor daily weights, 1500 cc fluid restriction, sodium restriction  Echocardiogram ordered to assess LV and valvular function  Cardiology consult- Dr Whitmore called

## 2018-02-10 LAB
-  AMIKACIN: SIGNIFICANT CHANGE UP
-  AMIKACIN: SIGNIFICANT CHANGE UP
-  AMPICILLIN/SULBACTAM: SIGNIFICANT CHANGE UP
-  AMPICILLIN/SULBACTAM: SIGNIFICANT CHANGE UP
-  AMPICILLIN: SIGNIFICANT CHANGE UP
-  AMPICILLIN: SIGNIFICANT CHANGE UP
-  AZTREONAM: SIGNIFICANT CHANGE UP
-  AZTREONAM: SIGNIFICANT CHANGE UP
-  CEFAZOLIN: SIGNIFICANT CHANGE UP
-  CEFAZOLIN: SIGNIFICANT CHANGE UP
-  CEFEPIME: SIGNIFICANT CHANGE UP
-  CEFEPIME: SIGNIFICANT CHANGE UP
-  CEFOXITIN: SIGNIFICANT CHANGE UP
-  CEFOXITIN: SIGNIFICANT CHANGE UP
-  CEFTAZIDIME: SIGNIFICANT CHANGE UP
-  CEFTAZIDIME: SIGNIFICANT CHANGE UP
-  CEFTRIAXONE: SIGNIFICANT CHANGE UP
-  CEFTRIAXONE: SIGNIFICANT CHANGE UP
-  CEFUROXIME: SIGNIFICANT CHANGE UP
-  CEFUROXIME: SIGNIFICANT CHANGE UP
-  CIPROFLOXACIN: SIGNIFICANT CHANGE UP
-  CIPROFLOXACIN: SIGNIFICANT CHANGE UP
-  ERTAPENEM: SIGNIFICANT CHANGE UP
-  ERTAPENEM: SIGNIFICANT CHANGE UP
-  GENTAMICIN: SIGNIFICANT CHANGE UP
-  GENTAMICIN: SIGNIFICANT CHANGE UP
-  IMIPENEM: SIGNIFICANT CHANGE UP
-  LEVOFLOXACIN: SIGNIFICANT CHANGE UP
-  LEVOFLOXACIN: SIGNIFICANT CHANGE UP
-  MEROPENEM: SIGNIFICANT CHANGE UP
-  MEROPENEM: SIGNIFICANT CHANGE UP
-  NITROFURANTOIN: SIGNIFICANT CHANGE UP
-  NITROFURANTOIN: SIGNIFICANT CHANGE UP
-  PIPERACILLIN/TAZOBACTAM: SIGNIFICANT CHANGE UP
-  PIPERACILLIN/TAZOBACTAM: SIGNIFICANT CHANGE UP
-  TIGECYCLINE: SIGNIFICANT CHANGE UP
-  TOBRAMYCIN: SIGNIFICANT CHANGE UP
-  TOBRAMYCIN: SIGNIFICANT CHANGE UP
-  TRIMETHOPRIM/SULFAMETHOXAZOLE: SIGNIFICANT CHANGE UP
-  TRIMETHOPRIM/SULFAMETHOXAZOLE: SIGNIFICANT CHANGE UP
BACTERIA UR CULT: SIGNIFICANT CHANGE UP
BUN SERPL-MCNC: 29 MG/DL — HIGH (ref 7–23)
CALCIUM SERPL-MCNC: 8.7 MG/DL — SIGNIFICANT CHANGE UP (ref 8.4–10.5)
CHLORIDE SERPL-SCNC: 97 MMOL/L — LOW (ref 98–107)
CO2 SERPL-SCNC: 31 MMOL/L — SIGNIFICANT CHANGE UP (ref 22–31)
CREAT SERPL-MCNC: 0.91 MG/DL — SIGNIFICANT CHANGE UP (ref 0.5–1.3)
GLUCOSE SERPL-MCNC: 102 MG/DL — HIGH (ref 70–99)
HCT VFR BLD CALC: 40.7 % — SIGNIFICANT CHANGE UP (ref 39–50)
HGB BLD-MCNC: 13 G/DL — SIGNIFICANT CHANGE UP (ref 13–17)
MAGNESIUM SERPL-MCNC: 2 MG/DL — SIGNIFICANT CHANGE UP (ref 1.6–2.6)
MCHC RBC-ENTMCNC: 30.2 PG — SIGNIFICANT CHANGE UP (ref 27–34)
MCHC RBC-ENTMCNC: 31.9 % — LOW (ref 32–36)
MCV RBC AUTO: 94.4 FL — SIGNIFICANT CHANGE UP (ref 80–100)
METHOD TYPE: SIGNIFICANT CHANGE UP
METHOD TYPE: SIGNIFICANT CHANGE UP
NRBC # FLD: 0 — SIGNIFICANT CHANGE UP
ORGANISM # SPEC MICROSCOPIC CNT: SIGNIFICANT CHANGE UP
PLATELET # BLD AUTO: 100 K/UL — LOW (ref 150–400)
PMV BLD: 10.5 FL — SIGNIFICANT CHANGE UP (ref 7–13)
POTASSIUM SERPL-MCNC: 3.8 MMOL/L — SIGNIFICANT CHANGE UP (ref 3.5–5.3)
POTASSIUM SERPL-SCNC: 3.8 MMOL/L — SIGNIFICANT CHANGE UP (ref 3.5–5.3)
RBC # BLD: 4.31 M/UL — SIGNIFICANT CHANGE UP (ref 4.2–5.8)
RBC # FLD: 15.2 % — HIGH (ref 10.3–14.5)
SODIUM SERPL-SCNC: 140 MMOL/L — SIGNIFICANT CHANGE UP (ref 135–145)
WBC # BLD: 8.38 K/UL — SIGNIFICANT CHANGE UP (ref 3.8–10.5)
WBC # FLD AUTO: 8.38 K/UL — SIGNIFICANT CHANGE UP (ref 3.8–10.5)

## 2018-02-10 PROCEDURE — 99233 SBSQ HOSP IP/OBS HIGH 50: CPT

## 2018-02-10 RX ADMIN — RIVASTIGMINE 4.5 MILLIGRAM(S): 4.6 PATCH, EXTENDED RELEASE TRANSDERMAL at 07:23

## 2018-02-10 RX ADMIN — BRIMONIDINE TARTRATE 1 DROP(S): 2 SOLUTION/ DROPS OPHTHALMIC at 07:22

## 2018-02-10 RX ADMIN — DIVALPROEX SODIUM 500 MILLIGRAM(S): 500 TABLET, DELAYED RELEASE ORAL at 14:48

## 2018-02-10 RX ADMIN — RIVASTIGMINE 4.5 MILLIGRAM(S): 4.6 PATCH, EXTENDED RELEASE TRANSDERMAL at 18:40

## 2018-02-10 RX ADMIN — TAMSULOSIN HYDROCHLORIDE 0.4 MILLIGRAM(S): 0.4 CAPSULE ORAL at 22:34

## 2018-02-10 RX ADMIN — DIVALPROEX SODIUM 500 MILLIGRAM(S): 500 TABLET, DELAYED RELEASE ORAL at 07:23

## 2018-02-10 RX ADMIN — DIVALPROEX SODIUM 500 MILLIGRAM(S): 500 TABLET, DELAYED RELEASE ORAL at 22:34

## 2018-02-10 RX ADMIN — LATANOPROST 1 DROP(S): 0.05 SOLUTION/ DROPS OPHTHALMIC; TOPICAL at 23:03

## 2018-02-10 RX ADMIN — Medication 1000 UNIT(S): at 14:48

## 2018-02-10 RX ADMIN — Medication 25 MILLIGRAM(S): at 18:40

## 2018-02-10 RX ADMIN — GABAPENTIN 300 MILLIGRAM(S): 400 CAPSULE ORAL at 14:48

## 2018-02-10 RX ADMIN — DORZOLAMIDE HYDROCHLORIDE TIMOLOL MALEATE 1 DROP(S): 20; 5 SOLUTION/ DROPS OPHTHALMIC at 23:03

## 2018-02-10 RX ADMIN — DORZOLAMIDE HYDROCHLORIDE TIMOLOL MALEATE 1 DROP(S): 20; 5 SOLUTION/ DROPS OPHTHALMIC at 07:23

## 2018-02-10 RX ADMIN — Medication 20 MILLIGRAM(S): at 07:23

## 2018-02-10 RX ADMIN — Medication 25 MILLIGRAM(S): at 07:23

## 2018-02-10 RX ADMIN — Medication 1 TABLET(S): at 14:48

## 2018-02-10 RX ADMIN — BRIMONIDINE TARTRATE 1 DROP(S): 2 SOLUTION/ DROPS OPHTHALMIC at 23:03

## 2018-02-10 RX ADMIN — Medication 20 MILLIGRAM(S): at 18:40

## 2018-02-10 RX ADMIN — Medication 112 MICROGRAM(S): at 07:23

## 2018-02-10 RX ADMIN — Medication 500 MILLIGRAM(S): at 14:48

## 2018-02-10 RX ADMIN — Medication 81 MILLIGRAM(S): at 14:48

## 2018-02-10 RX ADMIN — GABAPENTIN 300 MILLIGRAM(S): 400 CAPSULE ORAL at 07:23

## 2018-02-10 RX ADMIN — GABAPENTIN 300 MILLIGRAM(S): 400 CAPSULE ORAL at 22:34

## 2018-02-10 NOTE — PROGRESS NOTE ADULT - PROBLEM SELECTOR PLAN 2
likely secondary to HF  s/p BIPAP in ED  Now on supplemental O2  Titrate  O2 to maintain saturation >95%

## 2018-02-10 NOTE — PROGRESS NOTE ADULT - SUBJECTIVE AND OBJECTIVE BOX
Patient is a 86y old  Male who presents with a chief complaint of Shortness of breath (08 Feb 2018 13:50)      SUBJECTIVE / OVERNIGHT EVENTS:    MEDICATIONS  (STANDING):  ascorbic acid 500 milliGRAM(s) Oral daily  aspirin  chewable 81 milliGRAM(s) Oral daily  brimonidine 0.2% Ophthalmic Solution 1 Drop(s) Both EYES two times a day  cholecalciferol 1000 Unit(s) Oral daily  diVALproex  milliGRAM(s) Oral three times a day  dorzolamide 2%/timolol 0.5% Ophthalmic Solution 1 Drop(s) Both EYES two times a day  furosemide   Injectable 20 milliGRAM(s) IV Push every 12 hours  gabapentin 300 milliGRAM(s) Oral three times a day  latanoprost 0.005% Ophthalmic Solution 1 Drop(s) Both EYES at bedtime  levothyroxine 112 MICROGram(s) Oral daily  metoprolol     tartrate 25 milliGRAM(s) Oral two times a day  multivitamin 1 Tablet(s) Oral daily  rivastigmine 4.5 milliGRAM(s) Oral two times a day  tamsulosin 0.4 milliGRAM(s) Oral at bedtime    MEDICATIONS  (PRN):      Vital Signs Last 24 Hrs  T(C): 36.4 (10 Feb 2018 04:57), Max: 36.5 (09 Feb 2018 11:08)  T(F): 97.5 (10 Feb 2018 04:57), Max: 97.7 (09 Feb 2018 11:08)  HR: 76 (10 Feb 2018 08:00) (68 - 85)  BP: 125/56 (10 Feb 2018 04:57) (123/63 - 153/78)  BP(mean): --  RR: 16 (10 Feb 2018 08:00) (16 - 20)  SpO2: 96% (10 Feb 2018 08:00) (96% - 97%)  CAPILLARY BLOOD GLUCOSE        I&O's Summary    09 Feb 2018 07:01  -  10 Feb 2018 07:00  --------------------------------------------------------  IN: 0 mL / OUT: 1100 mL / NET: -1100 mL    10 Feb 2018 07:01  -  10 Feb 2018 09:53  --------------------------------------------------------  IN: 118 mL / OUT: 0 mL / NET: 118 mL        PHYSICAL EXAM:  GENERAL: NAD, well-developed  HEAD:  Atraumatic, Normocephalic  EYES: EOMI, PERRLA, conjunctiva and sclera clear  NECK: Supple, No JVD  CHEST/LUNG: Clear to auscultation bilaterally; No wheeze  HEART: Regular rate and rhythm; No murmurs, rubs, or gallops  ABDOMEN: Soft, Nontender, Nondistended; Bowel sounds present  EXTREMITIES:  2+ Peripheral Pulses, No clubbing, cyanosis, or edema  PSYCH: AAOx3  NEUROLOGY: non-focal  SKIN: No rashes or lesions    LABS:                        13.0   8.38  )-----------( 100      ( 10 Feb 2018 06:00 )             40.7     02-10    140  |  97<L>  |  29<H>  ----------------------------<  102<H>  3.8   |  31  |  0.91    Ca    8.7      10 Feb 2018 06:00  Mg     2.0     02-10        CARDIAC MARKERS ( 08 Feb 2018 12:45 )  x     / 0.06 ng/mL / 87 u/L / 6.39 ng/mL / x              Culture - Urine (collected 08 Feb 2018 08:35)  Source: URINE CATHETER  Preliminary Report (09 Feb 2018 09:37):    GNR^Gram Neg Rods    COLONY COUNT: 50,000-99,000 CFU/mL    GNR#2^Gram Neg Rods 2    COLONY COUNT: 10,000-49,000 CFU/mL    Culture - Blood (collected 08 Feb 2018 07:21)  Source: BLOOD VENOUS  Preliminary Report (10 Feb 2018 07:20):    NO ORGANISMS ISOLATED    NO ORGANISMS ISOLATED AT 48 HRS.    Culture - Blood (collected 08 Feb 2018 07:21)  Source: BLOOD PERIPHERAL  Preliminary Report (10 Feb 2018 07:20):    NO ORGANISMS ISOLATED    NO ORGANISMS ISOLATED AT 48 HRS.          RADIOLOGY & ADDITIONAL TESTS:    Imaging Personally Reviewed:    Consultant(s) Notes Reviewed:      Care Discussed with Consultants/Other Providers: Patient is a 86y old  Male who presents with a chief complaint of Shortness of breath (08 Feb 2018 13:50)      SUBJECTIVE / OVERNIGHT EVENTS: pt denied SOB, saturated well on nasal canula. negative fluid balance.      MEDICATIONS  (STANDING):  ascorbic acid 500 milliGRAM(s) Oral daily  aspirin  chewable 81 milliGRAM(s) Oral daily  brimonidine 0.2% Ophthalmic Solution 1 Drop(s) Both EYES two times a day  cholecalciferol 1000 Unit(s) Oral daily  diVALproex  milliGRAM(s) Oral three times a day  dorzolamide 2%/timolol 0.5% Ophthalmic Solution 1 Drop(s) Both EYES two times a day  furosemide   Injectable 20 milliGRAM(s) IV Push every 12 hours  gabapentin 300 milliGRAM(s) Oral three times a day  latanoprost 0.005% Ophthalmic Solution 1 Drop(s) Both EYES at bedtime  levothyroxine 112 MICROGram(s) Oral daily  metoprolol     tartrate 25 milliGRAM(s) Oral two times a day  multivitamin 1 Tablet(s) Oral daily  rivastigmine 4.5 milliGRAM(s) Oral two times a day  tamsulosin 0.4 milliGRAM(s) Oral at bedtime    MEDICATIONS  (PRN):      Vital Signs Last 24 Hrs  T(C): 36.4 (10 Feb 2018 04:57), Max: 36.5 (09 Feb 2018 11:08)  T(F): 97.5 (10 Feb 2018 04:57), Max: 97.7 (09 Feb 2018 11:08)  HR: 76 (10 Feb 2018 08:00) (68 - 85)  BP: 125/56 (10 Feb 2018 04:57) (123/63 - 153/78)  BP(mean): --  RR: 16 (10 Feb 2018 08:00) (16 - 20)  SpO2: 96% (10 Feb 2018 08:00) (96% - 97%)  CAPILLARY BLOOD GLUCOSE        I&O's Summary    09 Feb 2018 07:01  -  10 Feb 2018 07:00  --------------------------------------------------------  IN: 0 mL / OUT: 1100 mL / NET: -1100 mL    10 Feb 2018 07:01  -  10 Feb 2018 09:53  --------------------------------------------------------  IN: 118 mL / OUT: 0 mL / NET: 118 mL        PHYSICAL EXAM:  GENERAL: NAD,   HEAD:  Atraumatic, Normocephalic  EYES: EOMI, PERRLA, conjunctiva and sclera clear  NECK: Supple, No JVD  CHEST/LUNG: Clear to auscultation bilaterally; No wheeze  HEART: Regular rate and rhythm; No murmurs, rubs, or gallops  ABDOMEN: Soft, Nontender, Nondistended; Bowel sounds present  EXTREMITIES:  2+ Peripheral Pulses, No clubbing, cyanosis, or edema  PSYCH: calm  NEUROLOGY: non-focal  SKIN: No rashes or lesions    LABS:                        13.0   8.38  )-----------( 100      ( 10 Feb 2018 06:00 )             40.7     02-10    140  |  97<L>  |  29<H>  ----------------------------<  102<H>  3.8   |  31  |  0.91    Ca    8.7      10 Feb 2018 06:00  Mg     2.0     02-10        CARDIAC MARKERS ( 08 Feb 2018 12:45 )  x     / 0.06 ng/mL / 87 u/L / 6.39 ng/mL / x              Culture - Urine (collected 08 Feb 2018 08:35)  Source: URINE CATHETER  Preliminary Report (09 Feb 2018 09:37):    GNR^Gram Neg Rods    COLONY COUNT: 50,000-99,000 CFU/mL    GNR#2^Gram Neg Rods 2    COLONY COUNT: 10,000-49,000 CFU/mL    Culture - Blood (collected 08 Feb 2018 07:21)  Source: BLOOD VENOUS  Preliminary Report (10 Feb 2018 07:20):    NO ORGANISMS ISOLATED    NO ORGANISMS ISOLATED AT 48 HRS.    Culture - Blood (collected 08 Feb 2018 07:21)  Source: BLOOD PERIPHERAL  Preliminary Report (10 Feb 2018 07:20):    NO ORGANISMS ISOLATED    NO ORGANISMS ISOLATED AT 48 HRS.          RADIOLOGY & ADDITIONAL TESTS:    Imaging Personally Reviewed:    Consultant(s) Notes Reviewed:      Care Discussed with Consultants/Other Providers:

## 2018-02-10 NOTE — PROGRESS NOTE ADULT - PROBLEM SELECTOR PLAN 3
CHADS score 5   Tele- Afib in 80-90s   Continue Metoprolol 25 BID for rate control  Continue ASA for lifelong CVA prevention (pt cannot be on systemic A/C secondary to prior ICH)

## 2018-02-10 NOTE — PROGRESS NOTE ADULT - PROBLEM SELECTOR PLAN 1
likely in the setting of aortic stenosis  TTE from 2016 shows normal LV function ,moderate aortic stenosis, pul HTN  Pt with signs of overload- LE /scrotal edema, Basilar rales   On Lasix 20mg IVP BID (pt became hypotensive with 40mg)  Pt appears comfortable on oxygen S/P BIPAP in ED (tolerating O2 at this time)  Strict I&Os, monitor daily weights, 1500 cc fluid restriction, sodium restriction  Echocardiogram ordered to assess LV and valvular function  Cardiology Dr Whitmore appreciated.

## 2018-02-10 NOTE — PROGRESS NOTE ADULT - PROBLEM SELECTOR PLAN 8
Pt with  urinary retention with chronic indwelling Martinez (changed last week by [facility])  Continue Martinez and Flomax  Urine cultures GNR, pt asymptomatic, WBC wnl, afebrile, likely colonization. monitor off Abx.   Pt with hydrocele on CT- non tender

## 2018-02-10 NOTE — PROGRESS NOTE ADULT - ASSESSMENT
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

## 2018-02-10 NOTE — PROGRESS NOTE ADULT - SUBJECTIVE AND OBJECTIVE BOX
Cardiovascular Disease Progress Note    Overnight events: No acute events overnight. Mr. Kinsey is feeling better. SOB is improved. No chest pain.   Otherwise review of systems negative    Objective Findings:  T(C): 36.4 (02-10-18 @ 04:57), Max: 36.5 (18 @ 11:08)  HR: 76 (02-10-18 @ 08:00) (68 - 85)  BP: 125/56 (02-10-18 @ 04:57) (123/63 - 153/78)  RR: 16 (02-10-18 @ 08:00) (16 - 20)  SpO2: 96% (02-10-18 @ 08:00) (96% - 97%)  Wt(kg): --  Daily     Daily Weight in k (10 Feb 2018 06:55)      Physical Exam:  Gen: NAD  HEENT: EOMI  CV: RRR, normal S1 + S2, no m/r/g  Lungs: Mild crackles b/l  Abd: soft, non-tender  Ext: 1- pedal edema    Telemetry: Sinus    Laboratory Data:                        13.0   8.38  )-----------( 100      ( 10 Feb 2018 06:00 )             40.7     02-10    140  |  97<L>  |  29<H>  ----------------------------<  102<H>  3.8   |  31  |  0.91    Ca    8.7      10 Feb 2018 06:00  Mg     2.0     02-10        CARDIAC MARKERS ( 2018 12:45 )  x     / 0.06 ng/mL / 87 u/L / 6.39 ng/mL / x              Inpatient Medications:  MEDICATIONS  (STANDING):  ascorbic acid 500 milliGRAM(s) Oral daily  aspirin  chewable 81 milliGRAM(s) Oral daily  brimonidine 0.2% Ophthalmic Solution 1 Drop(s) Both EYES two times a day  cholecalciferol 1000 Unit(s) Oral daily  diVALproex  milliGRAM(s) Oral three times a day  dorzolamide 2%/timolol 0.5% Ophthalmic Solution 1 Drop(s) Both EYES two times a day  furosemide   Injectable 20 milliGRAM(s) IV Push every 12 hours  gabapentin 300 milliGRAM(s) Oral three times a day  latanoprost 0.005% Ophthalmic Solution 1 Drop(s) Both EYES at bedtime  levothyroxine 112 MICROGram(s) Oral daily  metoprolol     tartrate 25 milliGRAM(s) Oral two times a day  multivitamin 1 Tablet(s) Oral daily  rivastigmine 4.5 milliGRAM(s) Oral two times a day  tamsulosin 0.4 milliGRAM(s) Oral at bedtime      Assessment: 86 year old man with moderate AS, HTN, and PAF presents with ADHF    Plan of Care:    #Acute on chronic diastolic CHF-  Patient clinically improving, but still fluid overloaded on exam.  Continue current dose of IV lasix.  Check TTE to assess LV function and severity of aortic stenosis.    #Paroxysmal a-fib- currently in sinus.  No AC due to h/o intracranial hemorrhage.     #HTN- BP acceptable on current regimen.    Will follow with you.     Over 35 minutes spent on total encounter; more than 50% of the visit was spent counseling and/or coordinating care by the attending physician.      Mohsen Whitmore MD [facility]  Cardiovascular Disease  [phone]

## 2018-02-11 LAB
BASOPHILS # BLD AUTO: 0.03 K/UL — SIGNIFICANT CHANGE UP (ref 0–0.2)
BASOPHILS NFR BLD AUTO: 0.4 % — SIGNIFICANT CHANGE UP (ref 0–2)
BUN SERPL-MCNC: 32 MG/DL — HIGH (ref 7–23)
CALCIUM SERPL-MCNC: 8.9 MG/DL — SIGNIFICANT CHANGE UP (ref 8.4–10.5)
CHLORIDE SERPL-SCNC: 101 MMOL/L — SIGNIFICANT CHANGE UP (ref 98–107)
CO2 SERPL-SCNC: 27 MMOL/L — SIGNIFICANT CHANGE UP (ref 22–31)
CREAT SERPL-MCNC: 0.91 MG/DL — SIGNIFICANT CHANGE UP (ref 0.5–1.3)
EOSINOPHIL # BLD AUTO: 0.09 K/UL — SIGNIFICANT CHANGE UP (ref 0–0.5)
EOSINOPHIL NFR BLD AUTO: 1.2 % — SIGNIFICANT CHANGE UP (ref 0–6)
GLUCOSE SERPL-MCNC: 66 MG/DL — LOW (ref 70–99)
HCT VFR BLD CALC: 43.4 % — SIGNIFICANT CHANGE UP (ref 39–50)
HCT VFR BLD CALC: 43.4 % — SIGNIFICANT CHANGE UP (ref 39–50)
HGB BLD-MCNC: 14 G/DL — SIGNIFICANT CHANGE UP (ref 13–17)
HGB BLD-MCNC: 14 G/DL — SIGNIFICANT CHANGE UP (ref 13–17)
IMM GRANULOCYTES # BLD AUTO: 0.05 # — SIGNIFICANT CHANGE UP
IMM GRANULOCYTES NFR BLD AUTO: 0.6 % — SIGNIFICANT CHANGE UP (ref 0–1.5)
LYMPHOCYTES # BLD AUTO: 2.35 K/UL — SIGNIFICANT CHANGE UP (ref 1–3.3)
LYMPHOCYTES # BLD AUTO: 30.3 % — SIGNIFICANT CHANGE UP (ref 13–44)
MAGNESIUM SERPL-MCNC: 3 MG/DL — HIGH (ref 1.6–2.6)
MCHC RBC-ENTMCNC: 30.9 PG — SIGNIFICANT CHANGE UP (ref 27–34)
MCHC RBC-ENTMCNC: 30.9 PG — SIGNIFICANT CHANGE UP (ref 27–34)
MCHC RBC-ENTMCNC: 32.3 % — SIGNIFICANT CHANGE UP (ref 32–36)
MCHC RBC-ENTMCNC: 32.3 % — SIGNIFICANT CHANGE UP (ref 32–36)
MCV RBC AUTO: 95.8 FL — SIGNIFICANT CHANGE UP (ref 80–100)
MCV RBC AUTO: 95.8 FL — SIGNIFICANT CHANGE UP (ref 80–100)
MONOCYTES # BLD AUTO: 1.12 K/UL — HIGH (ref 0–0.9)
MONOCYTES NFR BLD AUTO: 14.4 % — HIGH (ref 2–14)
NEUTROPHILS # BLD AUTO: 4.12 K/UL — SIGNIFICANT CHANGE UP (ref 1.8–7.4)
NEUTROPHILS NFR BLD AUTO: 53.1 % — SIGNIFICANT CHANGE UP (ref 43–77)
NRBC # FLD: 0 — SIGNIFICANT CHANGE UP
NRBC # FLD: 0 — SIGNIFICANT CHANGE UP
PLATELET # BLD AUTO: 76 K/UL — LOW (ref 150–400)
PLATELET # BLD AUTO: 76 K/UL — LOW (ref 150–400)
PMV BLD: 10.5 FL — SIGNIFICANT CHANGE UP (ref 7–13)
PMV BLD: 10.5 FL — SIGNIFICANT CHANGE UP (ref 7–13)
POTASSIUM SERPL-MCNC: 4.5 MMOL/L — SIGNIFICANT CHANGE UP (ref 3.5–5.3)
POTASSIUM SERPL-SCNC: 4.5 MMOL/L — SIGNIFICANT CHANGE UP (ref 3.5–5.3)
RBC # BLD: 4.53 M/UL — SIGNIFICANT CHANGE UP (ref 4.2–5.8)
RBC # BLD: 4.53 M/UL — SIGNIFICANT CHANGE UP (ref 4.2–5.8)
RBC # FLD: 15.1 % — HIGH (ref 10.3–14.5)
RBC # FLD: 15.1 % — HIGH (ref 10.3–14.5)
SODIUM SERPL-SCNC: 143 MMOL/L — SIGNIFICANT CHANGE UP (ref 135–145)
WBC # BLD: 7.76 K/UL — SIGNIFICANT CHANGE UP (ref 3.8–10.5)
WBC # BLD: 7.76 K/UL — SIGNIFICANT CHANGE UP (ref 3.8–10.5)
WBC # FLD AUTO: 7.76 K/UL — SIGNIFICANT CHANGE UP (ref 3.8–10.5)
WBC # FLD AUTO: 7.76 K/UL — SIGNIFICANT CHANGE UP (ref 3.8–10.5)

## 2018-02-11 PROCEDURE — 99233 SBSQ HOSP IP/OBS HIGH 50: CPT

## 2018-02-11 RX ADMIN — GABAPENTIN 300 MILLIGRAM(S): 400 CAPSULE ORAL at 14:10

## 2018-02-11 RX ADMIN — Medication 20 MILLIGRAM(S): at 05:55

## 2018-02-11 RX ADMIN — Medication 1 TABLET(S): at 14:10

## 2018-02-11 RX ADMIN — TAMSULOSIN HYDROCHLORIDE 0.4 MILLIGRAM(S): 0.4 CAPSULE ORAL at 22:19

## 2018-02-11 RX ADMIN — Medication 25 MILLIGRAM(S): at 05:55

## 2018-02-11 RX ADMIN — RIVASTIGMINE 4.5 MILLIGRAM(S): 4.6 PATCH, EXTENDED RELEASE TRANSDERMAL at 17:07

## 2018-02-11 RX ADMIN — DIVALPROEX SODIUM 500 MILLIGRAM(S): 500 TABLET, DELAYED RELEASE ORAL at 22:19

## 2018-02-11 RX ADMIN — Medication 20 MILLIGRAM(S): at 17:07

## 2018-02-11 RX ADMIN — Medication 81 MILLIGRAM(S): at 14:09

## 2018-02-11 RX ADMIN — DORZOLAMIDE HYDROCHLORIDE TIMOLOL MALEATE 1 DROP(S): 20; 5 SOLUTION/ DROPS OPHTHALMIC at 05:56

## 2018-02-11 RX ADMIN — Medication 25 MILLIGRAM(S): at 17:07

## 2018-02-11 RX ADMIN — Medication 500 MILLIGRAM(S): at 14:09

## 2018-02-11 RX ADMIN — DIVALPROEX SODIUM 500 MILLIGRAM(S): 500 TABLET, DELAYED RELEASE ORAL at 05:55

## 2018-02-11 RX ADMIN — BRIMONIDINE TARTRATE 1 DROP(S): 2 SOLUTION/ DROPS OPHTHALMIC at 22:19

## 2018-02-11 RX ADMIN — DORZOLAMIDE HYDROCHLORIDE TIMOLOL MALEATE 1 DROP(S): 20; 5 SOLUTION/ DROPS OPHTHALMIC at 22:19

## 2018-02-11 RX ADMIN — GABAPENTIN 300 MILLIGRAM(S): 400 CAPSULE ORAL at 05:55

## 2018-02-11 RX ADMIN — BRIMONIDINE TARTRATE 1 DROP(S): 2 SOLUTION/ DROPS OPHTHALMIC at 05:56

## 2018-02-11 RX ADMIN — DIVALPROEX SODIUM 500 MILLIGRAM(S): 500 TABLET, DELAYED RELEASE ORAL at 14:10

## 2018-02-11 RX ADMIN — GABAPENTIN 300 MILLIGRAM(S): 400 CAPSULE ORAL at 22:19

## 2018-02-11 RX ADMIN — LATANOPROST 1 DROP(S): 0.05 SOLUTION/ DROPS OPHTHALMIC; TOPICAL at 22:19

## 2018-02-11 RX ADMIN — RIVASTIGMINE 4.5 MILLIGRAM(S): 4.6 PATCH, EXTENDED RELEASE TRANSDERMAL at 05:55

## 2018-02-11 RX ADMIN — Medication 112 MICROGRAM(S): at 05:55

## 2018-02-11 RX ADMIN — Medication 1000 UNIT(S): at 14:09

## 2018-02-11 NOTE — PROGRESS NOTE ADULT - SUBJECTIVE AND OBJECTIVE BOX
Patient is a 86y old  Male who presents with a chief complaint of Shortness of breath (08 Feb 2018 13:50)      SUBJECTIVE / OVERNIGHT EVENTS: negative fluid balance, no SOB, saturating well on O2 supply    MEDICATIONS  (STANDING):  ascorbic acid 500 milliGRAM(s) Oral daily  aspirin  chewable 81 milliGRAM(s) Oral daily  brimonidine 0.2% Ophthalmic Solution 1 Drop(s) Both EYES two times a day  cholecalciferol 1000 Unit(s) Oral daily  diVALproex  milliGRAM(s) Oral three times a day  dorzolamide 2%/timolol 0.5% Ophthalmic Solution 1 Drop(s) Both EYES two times a day  furosemide   Injectable 20 milliGRAM(s) IV Push every 12 hours  gabapentin 300 milliGRAM(s) Oral three times a day  latanoprost 0.005% Ophthalmic Solution 1 Drop(s) Both EYES at bedtime  levothyroxine 112 MICROGram(s) Oral daily  metoprolol     tartrate 25 milliGRAM(s) Oral two times a day  multivitamin 1 Tablet(s) Oral daily  rivastigmine 4.5 milliGRAM(s) Oral two times a day  tamsulosin 0.4 milliGRAM(s) Oral at bedtime    MEDICATIONS  (PRN):      Vital Signs Last 24 Hrs  T(C): 37.3 (11 Feb 2018 05:50), Max: 37.3 (11 Feb 2018 05:50)  T(F): 99.1 (11 Feb 2018 05:50), Max: 99.1 (11 Feb 2018 05:50)  HR: 83 (11 Feb 2018 05:50) (72 - 97)  BP: 126/68 (11 Feb 2018 05:50) (126/68 - 148/74)  BP(mean): --  RR: 17 (11 Feb 2018 05:50) (16 - 17)  SpO2: 97% (11 Feb 2018 05:50) (95% - 97%)  CAPILLARY BLOOD GLUCOSE        I&O's Summary    10 Feb 2018 07:01  -  11 Feb 2018 07:00  --------------------------------------------------------  IN: 718 mL / OUT: 1200 mL / NET: -482 mL        PHYSICAL EXAM:  GENERAL: NAD,   HEAD:  Atraumatic, Normocephalic  EYES: EOMI, PERRLA, conjunctiva and sclera clear  NECK: Supple, No JVD  CHEST/LUNG: Clear to auscultation bilaterally; No wheeze  HEART: Regular rate and rhythm; No murmurs, rubs, or gallops  ABDOMEN: Soft, Nontender, Nondistended; Bowel sounds present  EXTREMITIES:  2+ Peripheral Pulses, No clubbing, cyanosis, 1+ pitting edema b/l  PSYCH: calm  NEUROLOGY: non-focal  SKIN: No rashes or lesions    LABS:                        14.0   7.76  )-----------( 76       ( 11 Feb 2018 05:20 )             43.4     02-11    143  |  101  |  32<H>  ----------------------------<  66<L>  4.5   |  27  |  0.91    Ca    8.9      11 Feb 2018 05:30  Mg     3.0     02-11                    RADIOLOGY & ADDITIONAL TESTS:    Imaging Personally Reviewed:    Consultant(s) Notes Reviewed:  cardiology    Care Discussed with Consultants/Other Providers:

## 2018-02-11 NOTE — PROGRESS NOTE ADULT - SUBJECTIVE AND OBJECTIVE BOX
Cardiovascular Disease Progress Note    Overnight events: No acute events overnight. Mr. Kinsey feels well. No chest pain or SOB.    Otherwise review of systems negative    Objective Findings:  T(C): 37.3 (18 @ 05:50), Max: 37.3 (18 @ 05:50)  HR: 83 (18 @ 05:50) (72 - 97)  BP: 126/68 (18 @ 05:50) (126/68 - 148/74)  RR: 17 (18 @ 05:50) (16 - 17)  SpO2: 97% (18 @ 05:50) (95% - 97%)  Wt(kg): --  Daily     Daily Weight in k (10 Feb 2018 20:23)      Physical Exam:  Gen: NAD  HEENT: EOMI  CV: RRR, normal S1 + S2, 2/6 systolic murmur.  Lungs: CTAB  Abd: soft, non-tender  Ext: 1+ pedal edema    Telemetry: V-pacing    Laboratory Data:                        14.0   7.76  )-----------( 76       ( 2018 05:20 )             43.4     -    143  |  101  |  32<H>  ----------------------------<  66<L>  4.5   |  27  |  0.91    Ca    8.9      2018 05:30  Mg     3.0     -        Inpatient Medications:  MEDICATIONS  (STANDING):  ascorbic acid 500 milliGRAM(s) Oral daily  aspirin  chewable 81 milliGRAM(s) Oral daily  brimonidine 0.2% Ophthalmic Solution 1 Drop(s) Both EYES two times a day  cholecalciferol 1000 Unit(s) Oral daily  diVALproex  milliGRAM(s) Oral three times a day  dorzolamide 2%/timolol 0.5% Ophthalmic Solution 1 Drop(s) Both EYES two times a day  furosemide   Injectable 20 milliGRAM(s) IV Push every 12 hours  gabapentin 300 milliGRAM(s) Oral three times a day  latanoprost 0.005% Ophthalmic Solution 1 Drop(s) Both EYES at bedtime  levothyroxine 112 MICROGram(s) Oral daily  metoprolol     tartrate 25 milliGRAM(s) Oral two times a day  multivitamin 1 Tablet(s) Oral daily  rivastigmine 4.5 milliGRAM(s) Oral two times a day  tamsulosin 0.4 milliGRAM(s) Oral at bedtime      Assessment: 86 year old man with moderate AS, HTN, and PAF presents with ADHF    Plan of Care:    #Acute on chronic diastolic CHF-  Patient clinically improving, but still fluid overloaded on exam.  Continue current dose of IV lasix, as patient has good UOP without signs of kidney injury.  Likely transition to oral Lasix .   Awaiting TTE to assess LV function and severity of aortic stenosis.    #Paroxysmal a-fib- currently in sinus.  No AC due to h/o intracranial hemorrhage.     #HTN- BP acceptable on current regimen.    Will follow with you.       Over 35 minutes spent on total encounter; more than 50% of the visit was spent counseling and/or coordinating care by the attending physician.      Mohsen Whitmore MD [facility]  Cardiovascular Disease  [phone]

## 2018-02-11 NOTE — PROGRESS NOTE ADULT - PROBLEM SELECTOR PLAN 8
Pt with  urinary retention with chronic indwelling Martinez (changed last week by [facility])  Continue Martinez and Flomax  Urine cultures ESBL, pt asymptomatic, WBC wnl, afebrile, likely colonization. monitor off Abx. Monica ID and AND RN, on contact isolation according to [facility] infectious control policy.   Pt with hydrocele on CT- non tender

## 2018-02-11 NOTE — DIETITIAN INITIAL EVALUATION ADULT. - OTHER INFO
Nutrition consult received for Registered Dietitian. RD attempted to secure telephone contact with patient's daughter; however response has yet to have been noted after multiple attempts. Pt is 87 y/o M with PMH of moderate AS, Afib, hemorrhagic CVA, seizure disorder HTN who presents to ED with SOB. As per RN; patient with good appetite and PO intake at present; needs assistance with meals No reports of nausea, vomiting, diarrhea or constipation nor any chewing or swallowing difficulties. From previous RD notes (03/14/16) patient used to weigh 93.6 kg (205.9 lbs), current weight 104.3 kg (231.4 lbs). Pt with +3 edema. Patient has no known food allergies. Noted patient is DNR/DNI.

## 2018-02-11 NOTE — DIETITIAN INITIAL EVALUATION ADULT. - ENERGY NEEDS
Current weight 104.3 kg ( 231.4 lbs), Height 175.26 cm, BMI 34  Edema +3 right leg;  left leg;  right ankle;  left ankle;  left foot;  right foot  No pressure injury

## 2018-02-12 LAB
BASOPHILS # BLD AUTO: 0.05 K/UL — SIGNIFICANT CHANGE UP (ref 0–0.2)
BASOPHILS NFR BLD AUTO: 0.6 % — SIGNIFICANT CHANGE UP (ref 0–2)
BUN SERPL-MCNC: 34 MG/DL — HIGH (ref 7–23)
CALCIUM SERPL-MCNC: 9 MG/DL — SIGNIFICANT CHANGE UP (ref 8.4–10.5)
CHLORIDE SERPL-SCNC: 101 MMOL/L — SIGNIFICANT CHANGE UP (ref 98–107)
CO2 SERPL-SCNC: 31 MMOL/L — SIGNIFICANT CHANGE UP (ref 22–31)
CREAT SERPL-MCNC: 0.89 MG/DL — SIGNIFICANT CHANGE UP (ref 0.5–1.3)
EOSINOPHIL # BLD AUTO: 0.1 K/UL — SIGNIFICANT CHANGE UP (ref 0–0.5)
EOSINOPHIL NFR BLD AUTO: 1.3 % — SIGNIFICANT CHANGE UP (ref 0–6)
GLUCOSE SERPL-MCNC: 92 MG/DL — SIGNIFICANT CHANGE UP (ref 70–99)
HCT VFR BLD CALC: 45.3 % — SIGNIFICANT CHANGE UP (ref 39–50)
HGB BLD-MCNC: 14.3 G/DL — SIGNIFICANT CHANGE UP (ref 13–17)
IMM GRANULOCYTES # BLD AUTO: 0.04 # — SIGNIFICANT CHANGE UP
IMM GRANULOCYTES NFR BLD AUTO: 0.5 % — SIGNIFICANT CHANGE UP (ref 0–1.5)
LYMPHOCYTES # BLD AUTO: 2.28 K/UL — SIGNIFICANT CHANGE UP (ref 1–3.3)
LYMPHOCYTES # BLD AUTO: 28.7 % — SIGNIFICANT CHANGE UP (ref 13–44)
MAGNESIUM SERPL-MCNC: 2.2 MG/DL — SIGNIFICANT CHANGE UP (ref 1.6–2.6)
MCHC RBC-ENTMCNC: 29.9 PG — SIGNIFICANT CHANGE UP (ref 27–34)
MCHC RBC-ENTMCNC: 31.6 % — LOW (ref 32–36)
MCV RBC AUTO: 94.8 FL — SIGNIFICANT CHANGE UP (ref 80–100)
MONOCYTES # BLD AUTO: 1.14 K/UL — HIGH (ref 0–0.9)
MONOCYTES NFR BLD AUTO: 14.4 % — HIGH (ref 2–14)
NEUTROPHILS # BLD AUTO: 4.33 K/UL — SIGNIFICANT CHANGE UP (ref 1.8–7.4)
NEUTROPHILS NFR BLD AUTO: 54.5 % — SIGNIFICANT CHANGE UP (ref 43–77)
NRBC # FLD: 0 — SIGNIFICANT CHANGE UP
PLATELET # BLD AUTO: 90 K/UL — LOW (ref 150–400)
PMV BLD: 10 FL — SIGNIFICANT CHANGE UP (ref 7–13)
POTASSIUM SERPL-MCNC: 3.7 MMOL/L — SIGNIFICANT CHANGE UP (ref 3.5–5.3)
POTASSIUM SERPL-SCNC: 3.7 MMOL/L — SIGNIFICANT CHANGE UP (ref 3.5–5.3)
RBC # BLD: 4.78 M/UL — SIGNIFICANT CHANGE UP (ref 4.2–5.8)
RBC # FLD: 15.1 % — HIGH (ref 10.3–14.5)
SODIUM SERPL-SCNC: 146 MMOL/L — HIGH (ref 135–145)
WBC # BLD: 7.94 K/UL — SIGNIFICANT CHANGE UP (ref 3.8–10.5)
WBC # FLD AUTO: 7.94 K/UL — SIGNIFICANT CHANGE UP (ref 3.8–10.5)

## 2018-02-12 PROCEDURE — 99233 SBSQ HOSP IP/OBS HIGH 50: CPT

## 2018-02-12 RX ORDER — FUROSEMIDE 40 MG
40 TABLET ORAL
Qty: 0 | Refills: 0 | Status: DISCONTINUED | OUTPATIENT
Start: 2018-02-12 | End: 2018-02-13

## 2018-02-12 RX ADMIN — LATANOPROST 1 DROP(S): 0.05 SOLUTION/ DROPS OPHTHALMIC; TOPICAL at 22:34

## 2018-02-12 RX ADMIN — BRIMONIDINE TARTRATE 1 DROP(S): 2 SOLUTION/ DROPS OPHTHALMIC at 22:34

## 2018-02-12 RX ADMIN — Medication 1000 UNIT(S): at 12:10

## 2018-02-12 RX ADMIN — Medication 112 MICROGRAM(S): at 05:51

## 2018-02-12 RX ADMIN — Medication 1 TABLET(S): at 12:10

## 2018-02-12 RX ADMIN — DORZOLAMIDE HYDROCHLORIDE TIMOLOL MALEATE 1 DROP(S): 20; 5 SOLUTION/ DROPS OPHTHALMIC at 05:52

## 2018-02-12 RX ADMIN — DORZOLAMIDE HYDROCHLORIDE TIMOLOL MALEATE 1 DROP(S): 20; 5 SOLUTION/ DROPS OPHTHALMIC at 22:34

## 2018-02-12 RX ADMIN — GABAPENTIN 300 MILLIGRAM(S): 400 CAPSULE ORAL at 14:42

## 2018-02-12 RX ADMIN — Medication 25 MILLIGRAM(S): at 17:20

## 2018-02-12 RX ADMIN — GABAPENTIN 300 MILLIGRAM(S): 400 CAPSULE ORAL at 22:33

## 2018-02-12 RX ADMIN — GABAPENTIN 300 MILLIGRAM(S): 400 CAPSULE ORAL at 05:51

## 2018-02-12 RX ADMIN — DIVALPROEX SODIUM 500 MILLIGRAM(S): 500 TABLET, DELAYED RELEASE ORAL at 14:42

## 2018-02-12 RX ADMIN — Medication 25 MILLIGRAM(S): at 05:51

## 2018-02-12 RX ADMIN — Medication 40 MILLIGRAM(S): at 17:20

## 2018-02-12 RX ADMIN — RIVASTIGMINE 4.5 MILLIGRAM(S): 4.6 PATCH, EXTENDED RELEASE TRANSDERMAL at 05:51

## 2018-02-12 RX ADMIN — Medication 20 MILLIGRAM(S): at 05:51

## 2018-02-12 RX ADMIN — DIVALPROEX SODIUM 500 MILLIGRAM(S): 500 TABLET, DELAYED RELEASE ORAL at 05:51

## 2018-02-12 RX ADMIN — Medication 500 MILLIGRAM(S): at 12:10

## 2018-02-12 RX ADMIN — BRIMONIDINE TARTRATE 1 DROP(S): 2 SOLUTION/ DROPS OPHTHALMIC at 05:52

## 2018-02-12 RX ADMIN — RIVASTIGMINE 4.5 MILLIGRAM(S): 4.6 PATCH, EXTENDED RELEASE TRANSDERMAL at 17:20

## 2018-02-12 RX ADMIN — TAMSULOSIN HYDROCHLORIDE 0.4 MILLIGRAM(S): 0.4 CAPSULE ORAL at 22:33

## 2018-02-12 RX ADMIN — Medication 81 MILLIGRAM(S): at 12:10

## 2018-02-12 RX ADMIN — DIVALPROEX SODIUM 500 MILLIGRAM(S): 500 TABLET, DELAYED RELEASE ORAL at 22:34

## 2018-02-12 NOTE — PROGRESS NOTE ADULT - PROBLEM SELECTOR PLAN 1
likely in the setting of aortic stenosis  TTE from 2016 shows normal LV function ,moderate aortic stenosis, pul HTN  Cardiology Dr Myranda cormier appreciated.  Diuretics changed to PO  Awaiting TTE  Breathing comfortably on NC

## 2018-02-12 NOTE — PROGRESS NOTE ADULT - PROBLEM SELECTOR PLAN 8
Pt with  urinary retention with chronic indwelling Martinez (changed last week by [facility])  Continue Amrtinez and Flomax  Urine cultures ESBL, pt asymptomatic, WBC wnl, afebrile, likely colonization. monitor off Abx. Monica ID and AND RN, on contact isolation according to [facility] infectious control policy.   Pt with hydrocele on CT- non tender

## 2018-02-12 NOTE — PROGRESS NOTE ADULT - PROBLEM SELECTOR PLAN 10
Discussed advanced directives with patient's son and daughter who are the patient's HCPs, they confirmed the prior [facility] directives where patient is DNR/DNI. They understand their father is chronically ill and would have a poor outcome after aggressive resuscitation measures, and that the patient would not want to go through being coded and intubated on pressors again.

## 2018-02-12 NOTE — PROGRESS NOTE ADULT - SUBJECTIVE AND OBJECTIVE BOX
CHIEF COMPLAINT: Patient is a 86y old  male who presents with a chief complaint of Shortness of breath (08 Feb 2018 13:50)      SUBJECTIVE / OVERNIGHT EVENTS:    No complaints. Feels comfortable.    MEDICATIONS  (STANDING):  ascorbic acid 500 milliGRAM(s) Oral daily  aspirin  chewable 81 milliGRAM(s) Oral daily  brimonidine 0.2% Ophthalmic Solution 1 Drop(s) Both EYES two times a day  cholecalciferol 1000 Unit(s) Oral daily  diVALproex  milliGRAM(s) Oral three times a day  dorzolamide 2%/timolol 0.5% Ophthalmic Solution 1 Drop(s) Both EYES two times a day  furosemide    Tablet 40 milliGRAM(s) Oral two times a day  gabapentin 300 milliGRAM(s) Oral three times a day  latanoprost 0.005% Ophthalmic Solution 1 Drop(s) Both EYES at bedtime  levothyroxine 112 MICROGram(s) Oral daily  metoprolol     tartrate 25 milliGRAM(s) Oral two times a day  multivitamin 1 Tablet(s) Oral daily  rivastigmine 4.5 milliGRAM(s) Oral two times a day  tamsulosin 0.4 milliGRAM(s) Oral at bedtime    MEDICATIONS  (PRN):      VITALS:  T(F): 97.4 (02-12-18 @ 05:48), Max: 98.8 (02-11-18 @ 22:17)  HR: 94 (02-12-18 @ 05:48) (75 - 94)  BP: 144/75 (02-12-18 @ 05:48) (135/71 - 156/80)  RR: 18 (02-12-18 @ 05:48) (17 - 18)  SpO2: 96% (02-12-18 @ 05:48)      CAPILLARY BLOOD GLUCOSE    Output     I&O's Summary  T(F): 97.4 (02-12-18 @ 05:48), Max: 98.8 (02-11-18 @ 22:17)  HR: 94 (02-12-18 @ 05:48) (75 - 94)  BP: 144/75 (02-12-18 @ 05:48) (135/71 - 156/80)  RR: 18 (02-12-18 @ 05:48) (17 - 18)  SpO2: 96% (02-12-18 @ 05:48)    PHYSICAL EXAM:  GENERAL: obese elderly man lying in bed, appears deconditioned  EYES: EOMI  NECK: Supple, No JVD  CHEST/LUNG: nonlabored breathing, minimal crackles at bases  HEART: nl S1/S2  ABDOMEN: nondistended, soft  EXTREMITIES:  no LE edema  NEUROLOGY: non-focal  SKIN: No rashes noted    LABS:              14.3                 146  | 31   | 34           7.94  >-----------< 90      ------------------------< 92                    45.3                 3.7  | 101  | 0.89                                         Ca 9.0   Mg 2.2   Ph x                          MICROBIOLOGY:        RADIOLOGY & ADDITIONAL TESTS:    Imaging Personally Reviewed:    < from: CT Pelvis w/ IV Cont (02.08.18 @ 09:36) >  IMPRESSION:   No abscess or gas collection in the pelvis.  Bilateral hydroceles.      < end of copied text >          [x ] Consultant(s) Notes Reviewed: cardiology  [ ] Care Discussed with Consultants/Other Providers:

## 2018-02-12 NOTE — PROGRESS NOTE ADULT - SUBJECTIVE AND OBJECTIVE BOX
Cardiovascular Disease Progress Note    Overnight events: No acute events overnight. Mr. Kinsey denies chest pain or SOB.   Otherwise review of systems negative    Objective Findings:  T(C): 36.3 (18 @ 05:48), Max: 37.1 (18 @ 22:17)  HR: 94 (18 @ 05:48) (75 - 94)  BP: 144/75 (18 @ 05:48) (135/71 - 156/80)  RR: 18 (18 @ 05:48) (17 - 18)  SpO2: 96% (18 @ 05:48) (95% - 97%)  Wt(kg): --  Daily     Daily Weight in k.4 (2018 07:27)      Physical Exam:  Gen: NAD  HEENT: EOMI  CV: RRR, normal S1 + S2, no m/r/g  Lungs: CTAB  Abd: soft, non-tender  Ext: No edema    Telemetry: a-fib 70s    Laboratory Data:                        14.3   7.94  )-----------( 90       ( 2018 05:50 )             45.3     -    146<H>  |  101  |  34<H>  ----------------------------<  92  3.7   |  31  |  0.89    Ca    9.0      2018 05:50  Mg     2.2     02-      Inpatient Medications:  MEDICATIONS  (STANDING):  ascorbic acid 500 milliGRAM(s) Oral daily  aspirin  chewable 81 milliGRAM(s) Oral daily  brimonidine 0.2% Ophthalmic Solution 1 Drop(s) Both EYES two times a day  cholecalciferol 1000 Unit(s) Oral daily  diVALproex  milliGRAM(s) Oral three times a day  dorzolamide 2%/timolol 0.5% Ophthalmic Solution 1 Drop(s) Both EYES two times a day  furosemide   Injectable 20 milliGRAM(s) IV Push every 12 hours  gabapentin 300 milliGRAM(s) Oral three times a day  latanoprost 0.005% Ophthalmic Solution 1 Drop(s) Both EYES at bedtime  levothyroxine 112 MICROGram(s) Oral daily  metoprolol     tartrate 25 milliGRAM(s) Oral two times a day  multivitamin 1 Tablet(s) Oral daily  rivastigmine 4.5 milliGRAM(s) Oral two times a day  tamsulosin 0.4 milliGRAM(s) Oral at bedtime      Assessment: 86 year old man with moderate AS, HTN, and PAF presents with ADHF    Plan of Care:    #Acute on chronic diastolic CHF-  Patient clinically improving.  Transition to Lasix 40 mg PO BID today.  Awaiting TTE to assess LV function and severity of aortic stenosis.    #Paroxysmal a-fib- currently in rate controlled a-fib.  No AC due to h/o intracranial hemorrhage.     #HTN- BP acceptable on current regimen.    Will follow with you.       Over 35 minutes spent on total encounter; more than 50% of the visit was spent counseling and/or coordinating care by the attending physician.      Mohsen Whitmore MD [facility]  Cardiovascular Disease  [phone]

## 2018-02-13 ENCOUNTER — TRANSCRIPTION ENCOUNTER (OUTPATIENT)
Age: 83
End: 2018-02-13

## 2018-02-13 LAB
BACTERIA BLD CULT: SIGNIFICANT CHANGE UP
BACTERIA BLD CULT: SIGNIFICANT CHANGE UP
BUN SERPL-MCNC: 37 MG/DL — HIGH (ref 7–23)
CALCIUM SERPL-MCNC: 9.3 MG/DL — SIGNIFICANT CHANGE UP (ref 8.4–10.5)
CHLORIDE SERPL-SCNC: 101 MMOL/L — SIGNIFICANT CHANGE UP (ref 98–107)
CO2 SERPL-SCNC: 32 MMOL/L — HIGH (ref 22–31)
CREAT SERPL-MCNC: 0.98 MG/DL — SIGNIFICANT CHANGE UP (ref 0.5–1.3)
GLUCOSE SERPL-MCNC: 102 MG/DL — HIGH (ref 70–99)
HCT VFR BLD CALC: 46.5 % — SIGNIFICANT CHANGE UP (ref 39–50)
HGB BLD-MCNC: 15.3 G/DL — SIGNIFICANT CHANGE UP (ref 13–17)
MAGNESIUM SERPL-MCNC: 2.2 MG/DL — SIGNIFICANT CHANGE UP (ref 1.6–2.6)
MCHC RBC-ENTMCNC: 31.7 PG — SIGNIFICANT CHANGE UP (ref 27–34)
MCHC RBC-ENTMCNC: 32.9 % — SIGNIFICANT CHANGE UP (ref 32–36)
MCV RBC AUTO: 96.5 FL — SIGNIFICANT CHANGE UP (ref 80–100)
NRBC # FLD: 0 — SIGNIFICANT CHANGE UP
PLATELET # BLD AUTO: 96 K/UL — LOW (ref 150–400)
PMV BLD: 10.7 FL — SIGNIFICANT CHANGE UP (ref 7–13)
POTASSIUM SERPL-MCNC: 3.6 MMOL/L — SIGNIFICANT CHANGE UP (ref 3.5–5.3)
POTASSIUM SERPL-SCNC: 3.6 MMOL/L — SIGNIFICANT CHANGE UP (ref 3.5–5.3)
RBC # BLD: 4.82 M/UL — SIGNIFICANT CHANGE UP (ref 4.2–5.8)
RBC # FLD: 15 % — HIGH (ref 10.3–14.5)
SODIUM SERPL-SCNC: 147 MMOL/L — HIGH (ref 135–145)
WBC # BLD: 8.89 K/UL — SIGNIFICANT CHANGE UP (ref 3.8–10.5)
WBC # FLD AUTO: 8.89 K/UL — SIGNIFICANT CHANGE UP (ref 3.8–10.5)

## 2018-02-13 PROCEDURE — 99232 SBSQ HOSP IP/OBS MODERATE 35: CPT

## 2018-02-13 RX ORDER — POTASSIUM CHLORIDE 20 MEQ
40 PACKET (EA) ORAL ONCE
Qty: 0 | Refills: 0 | Status: COMPLETED | OUTPATIENT
Start: 2018-02-13 | End: 2018-02-13

## 2018-02-13 RX ORDER — FUROSEMIDE 40 MG
40 TABLET ORAL DAILY
Qty: 0 | Refills: 0 | Status: DISCONTINUED | OUTPATIENT
Start: 2018-02-13 | End: 2018-02-14

## 2018-02-13 RX ADMIN — BRIMONIDINE TARTRATE 1 DROP(S): 2 SOLUTION/ DROPS OPHTHALMIC at 05:45

## 2018-02-13 RX ADMIN — Medication 25 MILLIGRAM(S): at 18:07

## 2018-02-13 RX ADMIN — Medication 1 TABLET(S): at 13:37

## 2018-02-13 RX ADMIN — DORZOLAMIDE HYDROCHLORIDE TIMOLOL MALEATE 1 DROP(S): 20; 5 SOLUTION/ DROPS OPHTHALMIC at 22:02

## 2018-02-13 RX ADMIN — Medication 25 MILLIGRAM(S): at 05:44

## 2018-02-13 RX ADMIN — BRIMONIDINE TARTRATE 1 DROP(S): 2 SOLUTION/ DROPS OPHTHALMIC at 22:02

## 2018-02-13 RX ADMIN — DIVALPROEX SODIUM 500 MILLIGRAM(S): 500 TABLET, DELAYED RELEASE ORAL at 05:44

## 2018-02-13 RX ADMIN — RIVASTIGMINE 4.5 MILLIGRAM(S): 4.6 PATCH, EXTENDED RELEASE TRANSDERMAL at 18:07

## 2018-02-13 RX ADMIN — GABAPENTIN 300 MILLIGRAM(S): 400 CAPSULE ORAL at 05:44

## 2018-02-13 RX ADMIN — Medication 40 MILLIEQUIVALENT(S): at 13:37

## 2018-02-13 RX ADMIN — Medication 112 MICROGRAM(S): at 05:44

## 2018-02-13 RX ADMIN — GABAPENTIN 300 MILLIGRAM(S): 400 CAPSULE ORAL at 22:02

## 2018-02-13 RX ADMIN — DIVALPROEX SODIUM 500 MILLIGRAM(S): 500 TABLET, DELAYED RELEASE ORAL at 22:02

## 2018-02-13 RX ADMIN — Medication 500 MILLIGRAM(S): at 13:37

## 2018-02-13 RX ADMIN — DORZOLAMIDE HYDROCHLORIDE TIMOLOL MALEATE 1 DROP(S): 20; 5 SOLUTION/ DROPS OPHTHALMIC at 05:45

## 2018-02-13 RX ADMIN — GABAPENTIN 300 MILLIGRAM(S): 400 CAPSULE ORAL at 13:37

## 2018-02-13 RX ADMIN — Medication 40 MILLIGRAM(S): at 05:44

## 2018-02-13 RX ADMIN — Medication 81 MILLIGRAM(S): at 13:37

## 2018-02-13 RX ADMIN — Medication 1000 UNIT(S): at 13:37

## 2018-02-13 RX ADMIN — DIVALPROEX SODIUM 500 MILLIGRAM(S): 500 TABLET, DELAYED RELEASE ORAL at 13:37

## 2018-02-13 RX ADMIN — LATANOPROST 1 DROP(S): 0.05 SOLUTION/ DROPS OPHTHALMIC; TOPICAL at 22:02

## 2018-02-13 RX ADMIN — TAMSULOSIN HYDROCHLORIDE 0.4 MILLIGRAM(S): 0.4 CAPSULE ORAL at 22:02

## 2018-02-13 RX ADMIN — RIVASTIGMINE 4.5 MILLIGRAM(S): 4.6 PATCH, EXTENDED RELEASE TRANSDERMAL at 05:44

## 2018-02-13 NOTE — DISCHARGE NOTE ADULT - MEDICATION SUMMARY - MEDICATIONS TO CHANGE
I will SWITCH the dose or number of times a day I take the medications listed below when I get home from the hospital:    gabapentin 300 mg oral tablet  -- 1 tab(s) by mouth 2 times a day    valproic acid 250 mg/5 mL oral syrup  -- 10 milliliter(s) by mouth every 8 hours

## 2018-02-13 NOTE — PROGRESS NOTE ADULT - PROBLEM SELECTOR PLAN 8
Pt with  urinary retention with chronic indwelling Martinez (changed last week by [facility])  Continue Martinez and Flomax  Urine cultures ESBL, pt asymptomatic, WBC wnl, afebrile, likely colonization. monitor off Abx. Monica ID and AND RN, on contact isolation according to [facility] infectious control policy.   Pt with hydrocele on CT- non tender

## 2018-02-13 NOTE — DISCHARGE NOTE ADULT - PATIENT PORTAL LINK FT
You can access the [facility] Patient Portal, offered by [facility], by registering with the following website: [email or web address]

## 2018-02-13 NOTE — PROGRESS NOTE ADULT - SUBJECTIVE AND OBJECTIVE BOX
CHIEF COMPLAINT: Patient is a 86y old  male who presents with a chief complaint of Shortness of breath (08 Feb 2018 13:50)      SUBJECTIVE / OVERNIGHT EVENTS:    No complaints.    MEDICATIONS  (STANDING):  ascorbic acid 500 milliGRAM(s) Oral daily  aspirin  chewable 81 milliGRAM(s) Oral daily  brimonidine 0.2% Ophthalmic Solution 1 Drop(s) Both EYES two times a day  cholecalciferol 1000 Unit(s) Oral daily  diVALproex  milliGRAM(s) Oral three times a day  dorzolamide 2%/timolol 0.5% Ophthalmic Solution 1 Drop(s) Both EYES two times a day  furosemide    Tablet 40 milliGRAM(s) Oral daily  gabapentin 300 milliGRAM(s) Oral three times a day  latanoprost 0.005% Ophthalmic Solution 1 Drop(s) Both EYES at bedtime  levothyroxine 112 MICROGram(s) Oral daily  metoprolol     tartrate 25 milliGRAM(s) Oral two times a day  multivitamin 1 Tablet(s) Oral daily  potassium chloride    Tablet ER 40 milliEquivalent(s) Oral once  rivastigmine 4.5 milliGRAM(s) Oral two times a day  tamsulosin 0.4 milliGRAM(s) Oral at bedtime    MEDICATIONS  (PRN):      VITALS:  T(F): 97.3 (02-13-18 @ 05:41), Max: 97.9 (02-12-18 @ 13:30)  HR: 87 (02-13-18 @ 05:41) (72 - 87)  BP: 134/67 (02-13-18 @ 05:41) (113/66 - 138/63)  RR: 18 (02-13-18 @ 05:41) (18 - 18)  SpO2: 96% (02-13-18 @ 05:41)      CAPILLARY BLOOD GLUCOSE    Output     I&O's Summary  T(F): 97.3 (02-13-18 @ 05:41), Max: 97.9 (02-12-18 @ 13:30)  HR: 87 (02-13-18 @ 05:41) (72 - 87)  BP: 134/67 (02-13-18 @ 05:41) (113/66 - 138/63)  RR: 18 (02-13-18 @ 05:41) (18 - 18)  SpO2: 96% (02-13-18 @ 05:41)    PHYSICAL EXAM:  GENERAL: elderly obese man lying in bed, resting, NAD  HEAD:  Atraumatic, Normocephalic  EYES: EOMI  NECK: Supple, No JVD  CHEST/LUNG: nonlabored breathing  HEART: nl S1/S2  ABDOMEN: nondistended, soft  EXTREMITIES:  no LE edema  NEUROLOGY: non-focal  SKIN: No rashes noted    LABS:              15.3                 147  | 32   | 37           8.89  >-----------< 96      ------------------------< 102                   46.5                 3.6  | 101  | 0.98                                         Ca 9.3   Mg 2.2   Ph x                          MICROBIOLOGY:        RADIOLOGY & ADDITIONAL TESTS:    Imaging Personally Reviewed:    < from: Xray Chest 1 View- PORTABLE-Urgent (02.08.18 @ 06:54) >    IMPRESSION:  Limited study suggesting mild interstitial edema.      < end of copied text >      [ x] Consultant(s) Notes Reviewed: cardiology  [ ] Care Discussed with Consultants/Other Providers:

## 2018-02-13 NOTE — PROGRESS NOTE ADULT - SUBJECTIVE AND OBJECTIVE BOX
Cardiovascular Disease Progress Note    Overnight events: No acute events overnight. Mr. Kinsey is resting comfortably. No chest pain or SOB.     Otherwise review of systems negative    Objective Findings:  T(C): 36.3 (18 @ 05:41), Max: 36.6 (18 @ 13:30)  HR: 87 (18 @ 05:41) (72 - 87)  BP: 134/67 (18 @ 05:41) (113/66 - 138/63)  RR: 18 (18 @ 05:41) (18 - 18)  SpO2: 96% (18 @ 05:41) (96% - 100%)  Wt(kg): --  Daily     Daily Weight in k.4 (2018 07:32)      Physical Exam:  Gen: NAD  HEENT: EOMI  CV: RRR, normal S1 + S2, no m/r/g  Lungs: CTAB  Abd: soft, non-tender  Ext: No edema     Telemetry: A-paced; V-sense    Laboratory Data:                        15.3   8.89  )-----------( 96       ( 2018 06:50 )             46.5     -    147<H>  |  101  |  37<H>  ----------------------------<  102<H>  3.6   |  32<H>  |  0.98    Ca    9.3      2018 06:50  Mg     2.2     -                Inpatient Medications:  MEDICATIONS  (STANDING):  ascorbic acid 500 milliGRAM(s) Oral daily  aspirin  chewable 81 milliGRAM(s) Oral daily  brimonidine 0.2% Ophthalmic Solution 1 Drop(s) Both EYES two times a day  cholecalciferol 1000 Unit(s) Oral daily  diVALproex  milliGRAM(s) Oral three times a day  dorzolamide 2%/timolol 0.5% Ophthalmic Solution 1 Drop(s) Both EYES two times a day  furosemide    Tablet 40 milliGRAM(s) Oral two times a day  gabapentin 300 milliGRAM(s) Oral three times a day  latanoprost 0.005% Ophthalmic Solution 1 Drop(s) Both EYES at bedtime  levothyroxine 112 MICROGram(s) Oral daily  metoprolol     tartrate 25 milliGRAM(s) Oral two times a day  multivitamin 1 Tablet(s) Oral daily  rivastigmine 4.5 milliGRAM(s) Oral two times a day  tamsulosin 0.4 milliGRAM(s) Oral at bedtime      Assessment: 86 year old man with moderate AS, HTN, and PAF presents with ADHF    Plan of Care:    #Acute on chronic diastolic CHF-  Patient euvolemic on exam.  Decrease Lasix  to 40 mg PO daily for rising BUN.  Discharge planning pending TTE to assess LV function and severity of aortic stenosis.    #Paroxysmal a-fib- currently in rate controlled a-fib.  No AC due to h/o intracranial hemorrhage.     #HTN- BP acceptable on current regimen.    Will follow with you.     Over 35 minutes spent on total encounter; more than 50% of the visit was spent counseling and/or coordinating care by the attending physician.      Mohsen Whitmore MD [facility]  Cardiovascular Disease  [phone]

## 2018-02-13 NOTE — DISCHARGE NOTE ADULT - HOSPITAL COURSE
87 y/o male with a PMHx of moderate AS, atrial fibrillation on ASA only secondary to ICH, bradycardia S/P St. Carl PPM, hemorrhagic CVA S/P craniotomy complicated by seizure disorder on Depakote, HTN, hypothyroidism, and urinary retention with chronic indwelling arauz (changed last week) presented to ED with shortness of breath and lower extremity edema, admitted to [facility] for acute on chronic heart failure. EKG: NSR at 77 bpm with sinus arrhythmia at 77 bpm with sinus arrhythmia, RBBB, LAD. Cardiac enzymes were negative x2. ProBNP: 2159. UA: Moderate blood, large LE. Urine culture grew ESBL, likely a colonizer from indwelling arauz. Patient was monitored off antibiotics. RVP: Negative. CXR:  Limited study suggesting mild interstitial edema. CT pelvis: No abscess or gas collection in the pelvis. Bilateral hydroceles. Pt was on BiPAP in the ED and weaned to NC. He was being diuresed with IV Lasix with improvement in fluid status and transitioned to oral Lasix. Cardiology recommended obtaining an echocardiogram to assess LV function and to evaluate if there is progression of AS. Echo REVEALED... 85 y/o male with a PMHx of moderate AS, atrial fibrillation on ASA only secondary to ICH, bradycardia S/P St. Carl PPM, hemorrhagic CVA S/P craniotomy complicated by seizure disorder on Depakote, HTN, hypothyroidism, and urinary retention with chronic indwelling arauz (changed last week) presented to ED with shortness of breath and lower extremity edema, admitted to [facility] for acute on chronic heart failure. EKG: NSR at 77 bpm with sinus arrhythmia at 77 bpm with sinus arrhythmia, RBBB, LAD. Cardiac enzymes were negative x2. ProBNP: 2159. UA: Moderate blood, large LE. Urine culture grew ESBL, likely a colonizer from indwelling arauz. Patient was monitored off antibiotics. RVP: Negative. CXR:  Limited study suggesting mild interstitial edema. CT pelvis: No abscess or gas collection in the pelvis. Bilateral hydroceles. Pt was on BiPAP in the ED and weaned to NC. He was being diuresed with IV Lasix with improvement in fluid status and transitioned to oral Lasix. Cardiology recommended obtaining an echocardiogram to assess LV function and to evaluate if there is progression of AS. Echo REVEALED: Calcified trileaflet aortic valve with decreased  opening. Peak transaortic valve gradient equals 33 mm Hg,  mean transaortic valve gradient equals 183 mm Hg, estimated  aortic valve area equals 0.6 sqcm (by continuity equation),  consistent with severe aortic stenosis.  2. Endocardium not well visualized; grossly normal left  ventricular systolic function. Endocardial visualization  enhanced with intravenous injection of echo contrast  (Definity).  3. The right ventricle is not well visualized; grossly  normal right ventricular systolic function.  *** Compared with echocardiogram of 5/5/2016, the aortic  stenosis has worsened. 85 y/o male with a PMHx of moderate AS, atrial fibrillation on ASA only secondary to ICH, bradycardia S/P St. Carl PPM, hemorrhagic CVA S/P craniotomy complicated by seizure disorder on Depakote, HTN, hypothyroidism, and urinary retention with chronic indwelling arauz (changed last week) presented to ED with shortness of breath and lower extremity edema, admitted to [facility] for acute on chronic heart failure. EKG: NSR at 77 bpm with sinus arrhythmia at 77 bpm with sinus arrhythmia, RBBB, LAD. Cardiac enzymes were negative x2. ProBNP: 2159. UA: Moderate blood, large LE. Urine culture grew ESBL, likely a colonizer from indwelling arauz. Patient was monitored off antibiotics. RVP: Negative. CXR:  Limited study suggesting mild interstitial edema. CT pelvis: No abscess or gas collection in the pelvis. Bilateral hydroceles. Pt was on BiPAP in the ED and weaned to NC. He was being diuresed with IV Lasix with improvement in fluid status and transitioned to oral Lasix. Cardiology recommended obtaining an echocardiogram to assess LV function and to evaluate if there is progression of AS. Echo REVEALED: Calcified trileaflet aortic valve with decreased  opening. Peak transaortic valve gradient equals 33 mm Hg,  mean transaortic valve gradient equals 183 mm Hg, estimated  aortic valve area equals 0.6 sqcm (by continuity equation),  consistent with severe aortic stenosis.  2. Endocardium not well visualized; grossly normal left  ventricular systolic function. Endocardial visualization  enhanced with intravenous injection of echo contrast  (Definity).  3. The right ventricle is not well visualized; grossly  normal right ventricular systolic function.  *** Compared with echocardiogram of 5/5/2016, the aortic  stenosis has worsened.    He was treated for Hypernatremia ans elevated creatinine.  Chronic arauz in place.  Stable for discharge home with home health aide on __________ 87 y/o male with a PMHx of moderate AS, atrial fibrillation on ASA only secondary to ICH, bradycardia S/P [facility] Carl PPM, hemorrhagic CVA S/P craniotomy complicated by seizure disorder on Depakote, HTN, hypothyroidism, and urinary retention with chronic indwelling arauz (changed last week) presented to ED with shortness of breath and lower extremity edema, admitted to [facility] for acute on chronic heart failure. EKG: NSR at 77 bpm with sinus arrhythmia at 77 bpm with sinus arrhythmia, RBBB, LAD. Cardiac enzymes were negative x2. ProBNP: 2159. UA: Moderate blood, large LE. Urine culture grew ESBL, likely a colonizer from indwelling arauz. Patient was monitored off antibiotics. RVP: Negative. CXR:  Limited study suggesting mild interstitial edema. CT pelvis: No abscess or gas collection in the pelvis. Bilateral hydroceles. Pt was on BiPAP in the ED and weaned to NC. He was being diuresed with IV Lasix with improvement in fluid status and transitioned to oral Lasix. Cardiology recommended obtaining an echocardiogram to assess LV function and to evaluate if there is progression of AS. Echo REVEALED: Calcified trileaflet aortic valve with decreased  opening. Peak transaortic valve gradient equals 33 mm Hg,  mean transaortic valve gradient equals 183 mm Hg, estimated  aortic valve area equals 0.6 sqcm (by continuity equation),  consistent with severe aortic stenosis.  2. Endocardium not well visualized; grossly normal left  ventricular systolic function. Endocardial visualization  enhanced with intravenous injection of echo contrast  (Definity).  3. The right ventricle is not well visualized; grossly  normal right ventricular systolic function.  *** Compared with echocardiogram of 5/5/2016, the aortic  stenosis has worsened.    He was treated for Hypernatremia and elevated creatinine.  Chronic arauz in place.  Stable for discharge home with home health aide on 2/16/18.

## 2018-02-13 NOTE — DISCHARGE NOTE ADULT - CARE PLAN
Principal Discharge DX:	Acute on chronic diastolic (congestive) heart failure  Goal:	Prevent future episodes  Assessment and plan of treatment:	Continue Lasix. DASH diet. 1500mL fluid restriction.  Follow up with your cardiologist, Dr. Wes Hunter, within 1-2 weeks. Call [phone] for appointment.  Secondary Diagnosis:	Respiratory distress  Assessment and plan of treatment:	Improved. Breathing comfortably on NC.  Follow up with your primary care provider within 1-2 weeks. Call for appointment.  Secondary Diagnosis:	PAF (paroxysmal atrial fibrillation)  Assessment and plan of treatment:	Continue Metoprolol. No anticoagulation secondary to ICH.  Follow up with your primary care provider and cardiologist within 1-2 weeks. Call for appointment.  Secondary Diagnosis:	HTN (hypertension)  Assessment and plan of treatment:	Continue Metoprolol. Monitor BP. DASH diet.  Follow up with your primary care provider within 1-2 weeks. Call for appointment.  Secondary Diagnosis:	AS (aortic stenosis)  Assessment and plan of treatment:	Follow up with your cardiologist, Dr. Wes Hunter, within 1-2 weeks. Call [phone] for appointment.  Secondary Diagnosis:	Seizure disorder  Assessment and plan of treatment:	Continue divalproex.  Follow up with your primary care provider within 1-2 weeks. Call for appointment.  Secondary Diagnosis:	Urinary retention  Assessment and plan of treatment:	Continue arauz.  Follow up with your primary care provider within 1-2 weeks. Call for appointment.

## 2018-02-13 NOTE — PROGRESS NOTE ADULT - PROBLEM SELECTOR PLAN 9
ASA only given history of ICH  No further DVT ppx      Dispo: medically stable for d/c, pending TTE  - will need close cardiology f/u given CHF exacerbation    >30 min spent in coordinating d/c of this patient

## 2018-02-13 NOTE — DISCHARGE NOTE ADULT - PROVIDER TOKENS
FREE:[LAST:[Elsa],FIRST:[Wes],PHONE:[phone]],FAX:[(   )    -],ADDRESS:[[address] [place] [id number]]]

## 2018-02-13 NOTE — DISCHARGE NOTE ADULT - MEDICATION SUMMARY - MEDICATIONS TO TAKE
I will START or STAY ON the medications listed below when I get home from the hospital:    aspirin 81 mg oral tablet, chewable  -- 1 tab(s) by mouth once a day  -- Indication: For Blood thinner    Flomax 0.4 mg oral capsule  -- 1 cap(s) by mouth once a day  -- Indication: For Urinery retention/prostate hypertrophy    Depakote 500 mg oral delayed release tablet  -- 1 tab(s) by mouth 3 times a day  -- Indication: For Seizures    gabapentin 300 mg oral tablet  -- 1 tab(s) by mouth 3 times a day  -- Indication: For PAin/seizures    metoprolol tartrate 25 mg oral tablet  -- 1 tab(s) by mouth 2 times a day  -- Indication: For Heart rate/function    Exelon 4.5 mg oral capsule  -- 1 cap(s) by mouth 2 times a day  -- Indication: For Tremor    furosemide 40 mg oral tablet  -- 1 tab(s) by mouth once a day  -- Indication: For fluid balance    Cranberry oral capsule  -- 1 tab(s) by mouth once a day  -- Indication: For Supplement    dorzolamide-timolol 2.23%-0.68% ophthalmic solution  -- 1 drop(s) to each affected eye 2 times a day  -- Indication: For eye    latanoprost 0.005% ophthalmic solution  -- 1 drop(s) to each affected eye once a day (in the evening)  -- Indication: For eye    Alphagan 0.2% ophthalmic solution  -- 1 drop(s) to each affected eye 2 times a day  -- Indication: For eye    Saccharomyces Boulardii+MOS oral capsule  -- 1 cap(s) by mouth 2 times a day  -- Indication: For Supplement    levothyroxine 112 mcg (0.112 mg) oral tablet  -- 1 tab(s) by mouth once a day  -- Indication: For thyroid    Multiple Vitamins oral tablet  -- 1 tab(s) by mouth once a day  -- Indication: For Supplement    Vitamin C 500 mg oral tablet  -- 1 tab(s) by mouth once a day  -- Indication: For Supplement    Vitamin D3 1000 intl units oral tablet  -- 1 tab(s) by mouth once a day  -- Indication: For Supplement

## 2018-02-13 NOTE — DISCHARGE NOTE ADULT - ADDITIONAL INSTRUCTIONS
Follow up with your cardiologist, Dr. Wes Hunter, within 1-2 weeks. Call [phone] for appointment.  Follow up with your primary care provider within 1-2 weeks. Call for appointment.

## 2018-02-14 LAB
BUN SERPL-MCNC: 50 MG/DL — HIGH (ref 7–23)
BUN SERPL-MCNC: 51 MG/DL — HIGH (ref 7–23)
CALCIUM SERPL-MCNC: 9 MG/DL — SIGNIFICANT CHANGE UP (ref 8.4–10.5)
CALCIUM SERPL-MCNC: 9 MG/DL — SIGNIFICANT CHANGE UP (ref 8.4–10.5)
CHLORIDE SERPL-SCNC: 101 MMOL/L — SIGNIFICANT CHANGE UP (ref 98–107)
CHLORIDE SERPL-SCNC: 103 MMOL/L — SIGNIFICANT CHANGE UP (ref 98–107)
CO2 SERPL-SCNC: 29 MMOL/L — SIGNIFICANT CHANGE UP (ref 22–31)
CO2 SERPL-SCNC: 33 MMOL/L — HIGH (ref 22–31)
CREAT SERPL-MCNC: 1.32 MG/DL — HIGH (ref 0.5–1.3)
CREAT SERPL-MCNC: 1.33 MG/DL — HIGH (ref 0.5–1.3)
GLUCOSE SERPL-MCNC: 128 MG/DL — HIGH (ref 70–99)
GLUCOSE SERPL-MCNC: 97 MG/DL — SIGNIFICANT CHANGE UP (ref 70–99)
HCT VFR BLD CALC: 45.3 % — SIGNIFICANT CHANGE UP (ref 39–50)
HGB BLD-MCNC: 14 G/DL — SIGNIFICANT CHANGE UP (ref 13–17)
MAGNESIUM SERPL-MCNC: 2.4 MG/DL — SIGNIFICANT CHANGE UP (ref 1.6–2.6)
MAGNESIUM SERPL-MCNC: 2.4 MG/DL — SIGNIFICANT CHANGE UP (ref 1.6–2.6)
MCHC RBC-ENTMCNC: 29.9 PG — SIGNIFICANT CHANGE UP (ref 27–34)
MCHC RBC-ENTMCNC: 30.9 % — LOW (ref 32–36)
MCV RBC AUTO: 96.8 FL — SIGNIFICANT CHANGE UP (ref 80–100)
NRBC # FLD: 0 — SIGNIFICANT CHANGE UP
PLATELET # BLD AUTO: 83 K/UL — LOW (ref 150–400)
PMV BLD: 10.5 FL — SIGNIFICANT CHANGE UP (ref 7–13)
POTASSIUM SERPL-MCNC: 3.7 MMOL/L — SIGNIFICANT CHANGE UP (ref 3.5–5.3)
POTASSIUM SERPL-MCNC: 4.2 MMOL/L — SIGNIFICANT CHANGE UP (ref 3.5–5.3)
POTASSIUM SERPL-SCNC: 3.7 MMOL/L — SIGNIFICANT CHANGE UP (ref 3.5–5.3)
POTASSIUM SERPL-SCNC: 4.2 MMOL/L — SIGNIFICANT CHANGE UP (ref 3.5–5.3)
RBC # BLD: 4.68 M/UL — SIGNIFICANT CHANGE UP (ref 4.2–5.8)
RBC # FLD: 15.3 % — HIGH (ref 10.3–14.5)
SODIUM SERPL-SCNC: 147 MMOL/L — HIGH (ref 135–145)
SODIUM SERPL-SCNC: 147 MMOL/L — HIGH (ref 135–145)
WBC # BLD: 9.87 K/UL — SIGNIFICANT CHANGE UP (ref 3.8–10.5)
WBC # FLD AUTO: 9.87 K/UL — SIGNIFICANT CHANGE UP (ref 3.8–10.5)

## 2018-02-14 PROCEDURE — 99233 SBSQ HOSP IP/OBS HIGH 50: CPT

## 2018-02-14 RX ORDER — FUROSEMIDE 40 MG
40 TABLET ORAL DAILY
Qty: 0 | Refills: 0 | Status: DISCONTINUED | OUTPATIENT
Start: 2018-02-16 | End: 2018-02-16

## 2018-02-14 RX ADMIN — Medication 112 MICROGRAM(S): at 05:12

## 2018-02-14 RX ADMIN — GABAPENTIN 300 MILLIGRAM(S): 400 CAPSULE ORAL at 23:32

## 2018-02-14 RX ADMIN — DIVALPROEX SODIUM 500 MILLIGRAM(S): 500 TABLET, DELAYED RELEASE ORAL at 14:19

## 2018-02-14 RX ADMIN — BRIMONIDINE TARTRATE 1 DROP(S): 2 SOLUTION/ DROPS OPHTHALMIC at 23:32

## 2018-02-14 RX ADMIN — BRIMONIDINE TARTRATE 1 DROP(S): 2 SOLUTION/ DROPS OPHTHALMIC at 05:12

## 2018-02-14 RX ADMIN — RIVASTIGMINE 4.5 MILLIGRAM(S): 4.6 PATCH, EXTENDED RELEASE TRANSDERMAL at 05:12

## 2018-02-14 RX ADMIN — TAMSULOSIN HYDROCHLORIDE 0.4 MILLIGRAM(S): 0.4 CAPSULE ORAL at 23:32

## 2018-02-14 RX ADMIN — DORZOLAMIDE HYDROCHLORIDE TIMOLOL MALEATE 1 DROP(S): 20; 5 SOLUTION/ DROPS OPHTHALMIC at 23:32

## 2018-02-14 RX ADMIN — Medication 81 MILLIGRAM(S): at 14:19

## 2018-02-14 RX ADMIN — Medication 25 MILLIGRAM(S): at 17:13

## 2018-02-14 RX ADMIN — DIVALPROEX SODIUM 500 MILLIGRAM(S): 500 TABLET, DELAYED RELEASE ORAL at 23:32

## 2018-02-14 RX ADMIN — DIVALPROEX SODIUM 500 MILLIGRAM(S): 500 TABLET, DELAYED RELEASE ORAL at 05:12

## 2018-02-14 RX ADMIN — RIVASTIGMINE 4.5 MILLIGRAM(S): 4.6 PATCH, EXTENDED RELEASE TRANSDERMAL at 17:13

## 2018-02-14 RX ADMIN — Medication 1 TABLET(S): at 14:19

## 2018-02-14 RX ADMIN — Medication 500 MILLIGRAM(S): at 14:19

## 2018-02-14 RX ADMIN — Medication 40 MILLIGRAM(S): at 05:12

## 2018-02-14 RX ADMIN — DORZOLAMIDE HYDROCHLORIDE TIMOLOL MALEATE 1 DROP(S): 20; 5 SOLUTION/ DROPS OPHTHALMIC at 05:13

## 2018-02-14 RX ADMIN — GABAPENTIN 300 MILLIGRAM(S): 400 CAPSULE ORAL at 14:19

## 2018-02-14 RX ADMIN — GABAPENTIN 300 MILLIGRAM(S): 400 CAPSULE ORAL at 05:12

## 2018-02-14 RX ADMIN — Medication 25 MILLIGRAM(S): at 05:12

## 2018-02-14 RX ADMIN — LATANOPROST 1 DROP(S): 0.05 SOLUTION/ DROPS OPHTHALMIC; TOPICAL at 23:32

## 2018-02-14 RX ADMIN — Medication 1000 UNIT(S): at 14:19

## 2018-02-14 NOTE — PROGRESS NOTE ADULT - SUBJECTIVE AND OBJECTIVE BOX
Cardiovascular Disease Progress Note    Overnight events: No acute events overnight. Mr. Kinsey denies chest pain or SOB.   Otherwise review of systems negative    Objective Findings:  T(C): 36.5 (18 @ 05:09), Max: 36.8 (18 @ 14:57)  HR: 79 (18 @ 05:09) (67 - 79)  BP: 136/60 (18 @ 05:09) (136/60 - 156/86)  RR: 18 (18 @ 05:09) (18 - 18)  SpO2: 96% (18 @ 05:09) (94% - 98%)  Wt(kg): --  Daily     Daily Weight in k.8 (2018 05:11)      Physical Exam:  Gen: NAD  HEENT: EOMI  CV: RRR, normal S1 + S2, no m/r/g  Lungs: CTAB  Abd: soft, non-tender  Ext: No edema    Telemetry: A pacing    Laboratory Data:                        14.0   9.87  )-----------( 83       ( 2018 07:31 )             45.3     02-13    147<H>  |  101  |  37<H>  ----------------------------<  102<H>  3.6   |  32<H>  |  0.98    Ca    9.3      2018 06:50  Mg     2.2     02-13                Inpatient Medications:  MEDICATIONS  (STANDING):  ascorbic acid 500 milliGRAM(s) Oral daily  aspirin  chewable 81 milliGRAM(s) Oral daily  brimonidine 0.2% Ophthalmic Solution 1 Drop(s) Both EYES two times a day  cholecalciferol 1000 Unit(s) Oral daily  diVALproex  milliGRAM(s) Oral three times a day  dorzolamide 2%/timolol 0.5% Ophthalmic Solution 1 Drop(s) Both EYES two times a day  furosemide    Tablet 40 milliGRAM(s) Oral daily  gabapentin 300 milliGRAM(s) Oral three times a day  latanoprost 0.005% Ophthalmic Solution 1 Drop(s) Both EYES at bedtime  levothyroxine 112 MICROGram(s) Oral daily  metoprolol     tartrate 25 milliGRAM(s) Oral two times a day  multivitamin 1 Tablet(s) Oral daily  rivastigmine 4.5 milliGRAM(s) Oral two times a day  tamsulosin 0.4 milliGRAM(s) Oral at bedtime      Assessment: 86 year old man with moderate AS, HTN, and PAF presents with ADHF    Plan of Care:    #Acute on chronic diastolic CHF-  Patient euvolemic on exam.  Continue Lasix  to 40 mg PO daily.  Discharge planning pending TTE to assess LV function and severity of aortic stenosis.    #Paroxysmal a-fib- currently in rate controlled a-fib.  No AC due to h/o intracranial hemorrhage.     #HTN- BP acceptable on current regimen.    Will follow with you.       Over 35 minutes spent on total encounter; more than 50% of the visit was spent counseling and/or coordinating care by the attending physician.      Mohsen Whitmore MD [facility]  Cardiovascular Disease  [phone]

## 2018-02-14 NOTE — PROGRESS NOTE ADULT - PROBLEM SELECTOR PLAN 1
likely in the setting of aortic stenosis  TTE from 2016 shows normal LV function ,moderate aortic stenosis, pul HTN  Cardiology Dr Myranda cormier appreciated.  Diuretics changed to PO  Awaiting TTE to assess LV function and severity of AS  Breathing comfortably on NC

## 2018-02-14 NOTE — PROGRESS NOTE ADULT - SUBJECTIVE AND OBJECTIVE BOX
CHIEF COMPLAINT: Patient is a 86y old  male who presents with a chief complaint of Shortness of breath (13 Feb 2018 14:24)      SUBJECTIVE / OVERNIGHT EVENTS:    No complaints today. Denies pain. Denies SOB.    MEDICATIONS  (STANDING):  ascorbic acid 500 milliGRAM(s) Oral daily  aspirin  chewable 81 milliGRAM(s) Oral daily  brimonidine 0.2% Ophthalmic Solution 1 Drop(s) Both EYES two times a day  cholecalciferol 1000 Unit(s) Oral daily  diVALproex  milliGRAM(s) Oral three times a day  dorzolamide 2%/timolol 0.5% Ophthalmic Solution 1 Drop(s) Both EYES two times a day  furosemide    Tablet 40 milliGRAM(s) Oral daily  gabapentin 300 milliGRAM(s) Oral three times a day  latanoprost 0.005% Ophthalmic Solution 1 Drop(s) Both EYES at bedtime  levothyroxine 112 MICROGram(s) Oral daily  metoprolol     tartrate 25 milliGRAM(s) Oral two times a day  multivitamin 1 Tablet(s) Oral daily  rivastigmine 4.5 milliGRAM(s) Oral two times a day  tamsulosin 0.4 milliGRAM(s) Oral at bedtime    MEDICATIONS  (PRN):      VITALS:  T(F): 98.7 (02-14-18 @ 12:46), Max: 98.7 (02-14-18 @ 12:46)  HR: 75 (02-14-18 @ 12:46) (67 - 79)  BP: 137/76 (02-14-18 @ 12:46) (136/60 - 156/86)  RR: 18 (02-14-18 @ 12:46) (18 - 18)  SpO2: 96% (02-14-18 @ 12:46)      CAPILLARY BLOOD GLUCOSE    Output     I&O's Summary  T(F): 98.7 (02-14-18 @ 12:46), Max: 98.7 (02-14-18 @ 12:46)  HR: 75 (02-14-18 @ 12:46) (67 - 79)  BP: 137/76 (02-14-18 @ 12:46) (136/60 - 156/86)  RR: 18 (02-14-18 @ 12:46) (18 - 18)  SpO2: 96% (02-14-18 @ 12:46)    PHYSICAL EXAM:  GENERAL: obese elderly man lying in bed in NAD  HEAD:  Atraumatic, Normocephalic  EYES: EOMI  NECK: Supple, No JVD  CHEST/LUNG: nonlabored breathing  HEART: nl S1/S2  ABDOMEN: nondistended, soft  EXTREMITIES:  no LE edema  NEUROLOGY: slightly dysarthric  SKIN: No rashes noted    LABS:              14.0                 147  | 29   | 51           9.87  >-----------< 83      ------------------------< 97                    45.3                 4.2  | 103  | 1.33                                         Ca 9.0   Mg 2.4   Ph x                          MICROBIOLOGY:        RADIOLOGY & ADDITIONAL TESTS:    Imaging Personally Reviewed:    [ ] Consultant(s) Notes Reviewed:  [ ] Care Discussed with Consultants/Other Providers:

## 2018-02-14 NOTE — PROGRESS NOTE ADULT - ASSESSMENT
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       *SHARONA: etiology unclear, in setting of hypernatremia, suspect volume depletion, free water depletion  - hold Lasix for now  - encourage oral hydration  - trend renal function      *Thrombocytopenia: etiology unclear, appears to be chronic  - trend 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       *SHARONA: etiology unclear, in setting of hypernatremia, suspect volume depletion, free water depletion  - hold Lasix for now  - encourage oral hydration  - trend renal function      *Hypernatremia: possibly 2/2 Lasix, poor PO intake of water  - liberate water intake  - monitor      *Thrombocytopenia: etiology unclear, appears to be chronic  - trend

## 2018-02-14 NOTE — PROGRESS NOTE ADULT - PROBLEM SELECTOR PLAN 8
Pt with  urinary retention with chronic indwelling Martinez (changed last week by [facility])  Continue Martinez and Flomax  Urine cultures ESBL, pt asymptomatic, WBC wnl, afebrile, likely colonization. monitor off Abx. Monica ID and AND RN, on contact isolation according to [facility] infectious control policy.   Pt with hydrocele on CT- non tender

## 2018-02-15 LAB
BUN SERPL-MCNC: 39 MG/DL — HIGH (ref 7–23)
BUN SERPL-MCNC: 40 MG/DL — HIGH (ref 7–23)
CALCIUM SERPL-MCNC: 9.3 MG/DL — SIGNIFICANT CHANGE UP (ref 8.4–10.5)
CALCIUM SERPL-MCNC: 9.3 MG/DL — SIGNIFICANT CHANGE UP (ref 8.4–10.5)
CHLORIDE SERPL-SCNC: 105 MMOL/L — SIGNIFICANT CHANGE UP (ref 98–107)
CHLORIDE SERPL-SCNC: 106 MMOL/L — SIGNIFICANT CHANGE UP (ref 98–107)
CO2 SERPL-SCNC: 32 MMOL/L — HIGH (ref 22–31)
CO2 SERPL-SCNC: 33 MMOL/L — HIGH (ref 22–31)
CREAT SERPL-MCNC: 0.97 MG/DL — SIGNIFICANT CHANGE UP (ref 0.5–1.3)
CREAT SERPL-MCNC: 1.02 MG/DL — SIGNIFICANT CHANGE UP (ref 0.5–1.3)
GLUCOSE SERPL-MCNC: 117 MG/DL — HIGH (ref 70–99)
GLUCOSE SERPL-MCNC: 127 MG/DL — HIGH (ref 70–99)
HCT VFR BLD CALC: 48.4 % — SIGNIFICANT CHANGE UP (ref 39–50)
HGB BLD-MCNC: 15 G/DL — SIGNIFICANT CHANGE UP (ref 13–17)
MAGNESIUM SERPL-MCNC: 2.4 MG/DL — SIGNIFICANT CHANGE UP (ref 1.6–2.6)
MCHC RBC-ENTMCNC: 29.8 PG — SIGNIFICANT CHANGE UP (ref 27–34)
MCHC RBC-ENTMCNC: 31 % — LOW (ref 32–36)
MCV RBC AUTO: 96 FL — SIGNIFICANT CHANGE UP (ref 80–100)
NRBC # FLD: 0 — SIGNIFICANT CHANGE UP
PLATELET # BLD AUTO: 84 K/UL — LOW (ref 150–400)
PMV BLD: 10.5 FL — SIGNIFICANT CHANGE UP (ref 7–13)
POTASSIUM SERPL-MCNC: 3.5 MMOL/L — SIGNIFICANT CHANGE UP (ref 3.5–5.3)
POTASSIUM SERPL-MCNC: 3.7 MMOL/L — SIGNIFICANT CHANGE UP (ref 3.5–5.3)
POTASSIUM SERPL-SCNC: 3.5 MMOL/L — SIGNIFICANT CHANGE UP (ref 3.5–5.3)
POTASSIUM SERPL-SCNC: 3.7 MMOL/L — SIGNIFICANT CHANGE UP (ref 3.5–5.3)
RBC # BLD: 5.04 M/UL — SIGNIFICANT CHANGE UP (ref 4.2–5.8)
RBC # FLD: 15.1 % — HIGH (ref 10.3–14.5)
SODIUM SERPL-SCNC: 149 MMOL/L — HIGH (ref 135–145)
SODIUM SERPL-SCNC: 152 MMOL/L — HIGH (ref 135–145)
WBC # BLD: 9.98 K/UL — SIGNIFICANT CHANGE UP (ref 3.8–10.5)
WBC # FLD AUTO: 9.98 K/UL — SIGNIFICANT CHANGE UP (ref 3.8–10.5)

## 2018-02-15 PROCEDURE — 93306 TTE W/DOPPLER COMPLETE: CPT | Mod: 26

## 2018-02-15 PROCEDURE — 99233 SBSQ HOSP IP/OBS HIGH 50: CPT

## 2018-02-15 RX ORDER — SODIUM CHLORIDE 9 MG/ML
1000 INJECTION, SOLUTION INTRAVENOUS
Qty: 0 | Refills: 0 | Status: DISCONTINUED | OUTPATIENT
Start: 2018-02-15 | End: 2018-02-16

## 2018-02-15 RX ADMIN — DORZOLAMIDE HYDROCHLORIDE TIMOLOL MALEATE 1 DROP(S): 20; 5 SOLUTION/ DROPS OPHTHALMIC at 21:22

## 2018-02-15 RX ADMIN — SODIUM CHLORIDE 50 MILLILITER(S): 9 INJECTION, SOLUTION INTRAVENOUS at 12:21

## 2018-02-15 RX ADMIN — BRIMONIDINE TARTRATE 1 DROP(S): 2 SOLUTION/ DROPS OPHTHALMIC at 07:05

## 2018-02-15 RX ADMIN — GABAPENTIN 300 MILLIGRAM(S): 400 CAPSULE ORAL at 07:04

## 2018-02-15 RX ADMIN — Medication 81 MILLIGRAM(S): at 11:54

## 2018-02-15 RX ADMIN — DORZOLAMIDE HYDROCHLORIDE TIMOLOL MALEATE 1 DROP(S): 20; 5 SOLUTION/ DROPS OPHTHALMIC at 07:05

## 2018-02-15 RX ADMIN — RIVASTIGMINE 4.5 MILLIGRAM(S): 4.6 PATCH, EXTENDED RELEASE TRANSDERMAL at 17:27

## 2018-02-15 RX ADMIN — Medication 25 MILLIGRAM(S): at 17:26

## 2018-02-15 RX ADMIN — Medication 112 MICROGRAM(S): at 07:05

## 2018-02-15 RX ADMIN — RIVASTIGMINE 4.5 MILLIGRAM(S): 4.6 PATCH, EXTENDED RELEASE TRANSDERMAL at 07:04

## 2018-02-15 RX ADMIN — Medication 500 MILLIGRAM(S): at 11:54

## 2018-02-15 RX ADMIN — DIVALPROEX SODIUM 500 MILLIGRAM(S): 500 TABLET, DELAYED RELEASE ORAL at 21:22

## 2018-02-15 RX ADMIN — Medication 25 MILLIGRAM(S): at 07:04

## 2018-02-15 RX ADMIN — TAMSULOSIN HYDROCHLORIDE 0.4 MILLIGRAM(S): 0.4 CAPSULE ORAL at 21:22

## 2018-02-15 RX ADMIN — LATANOPROST 1 DROP(S): 0.05 SOLUTION/ DROPS OPHTHALMIC; TOPICAL at 21:22

## 2018-02-15 RX ADMIN — DIVALPROEX SODIUM 500 MILLIGRAM(S): 500 TABLET, DELAYED RELEASE ORAL at 07:04

## 2018-02-15 RX ADMIN — Medication 1000 UNIT(S): at 11:54

## 2018-02-15 RX ADMIN — GABAPENTIN 300 MILLIGRAM(S): 400 CAPSULE ORAL at 11:54

## 2018-02-15 RX ADMIN — Medication 1 TABLET(S): at 11:54

## 2018-02-15 RX ADMIN — BRIMONIDINE TARTRATE 1 DROP(S): 2 SOLUTION/ DROPS OPHTHALMIC at 21:22

## 2018-02-15 RX ADMIN — GABAPENTIN 300 MILLIGRAM(S): 400 CAPSULE ORAL at 21:22

## 2018-02-15 RX ADMIN — DIVALPROEX SODIUM 500 MILLIGRAM(S): 500 TABLET, DELAYED RELEASE ORAL at 11:54

## 2018-02-15 NOTE — PROGRESS NOTE ADULT - SUBJECTIVE AND OBJECTIVE BOX
CHIEF COMPLAINT: Patient is a 86y old  male who presents with a chief complaint of Shortness of breath (13 Feb 2018 14:24)      SUBJECTIVE / OVERNIGHT EVENTS:    Denies SOB. Denies abdominal pain. Thirsty.    MEDICATIONS  (STANDING):  ascorbic acid 500 milliGRAM(s) Oral daily  aspirin  chewable 81 milliGRAM(s) Oral daily  brimonidine 0.2% Ophthalmic Solution 1 Drop(s) Both EYES two times a day  cholecalciferol 1000 Unit(s) Oral daily  dextrose 5%. 1000 milliLiter(s) (50 mL/Hr) IV Continuous <Continuous>  diVALproex  milliGRAM(s) Oral three times a day  dorzolamide 2%/timolol 0.5% Ophthalmic Solution 1 Drop(s) Both EYES two times a day  gabapentin 300 milliGRAM(s) Oral three times a day  latanoprost 0.005% Ophthalmic Solution 1 Drop(s) Both EYES at bedtime  levothyroxine 112 MICROGram(s) Oral daily  metoprolol     tartrate 25 milliGRAM(s) Oral two times a day  multivitamin 1 Tablet(s) Oral daily  rivastigmine 4.5 milliGRAM(s) Oral two times a day  tamsulosin 0.4 milliGRAM(s) Oral at bedtime    MEDICATIONS  (PRN):      VITALS:  T(F): 98.2 (02-15-18 @ 06:59), Max: 98.2 (02-15-18 @ 06:59)  HR: 95 (02-15-18 @ 06:59) (95 - 95)  BP: 137/61 (02-15-18 @ 06:59) (137/61 - 137/61)  RR: 18 (02-15-18 @ 06:59) (18 - 18)  SpO2: 96% (02-15-18 @ 06:59)      CAPILLARY BLOOD GLUCOSE    Output     I&O's Summary  T(F): 98.2 (02-15-18 @ 06:59), Max: 98.2 (02-15-18 @ 06:59)  HR: 95 (02-15-18 @ 06:59) (95 - 95)  BP: 137/61 (02-15-18 @ 06:59) (137/61 - 137/61)  RR: 18 (02-15-18 @ 06:59) (18 - 18)  SpO2: 96% (02-15-18 @ 06:59)    PHYSICAL EXAM:  GENERAL: NAD, well-developed  HEAD:  Atraumatic, Normocephalic  EYES: EOMI  NECK: Supple, No JVD  CHEST/LUNG: nonlabored breathing  HEART: nl S1/S2  ABDOMEN: nondistended, soft  EXTREMITIES:  no LE edema  NEUROLOGY: non-focal  SKIN: No rashes noted    LABS:              15.0                 152  | 32   | 39           9.98  >-----------< 84      ------------------------< 117                   48.4                 3.5  | 106  | 0.97                                         Ca 9.3   Mg 2.4   Ph x                          MICROBIOLOGY:        RADIOLOGY & ADDITIONAL TESTS:    Imaging Personally Reviewed:    [ ] Consultant(s) Notes Reviewed:  [ ] Care Discussed with Consultants/Other Providers:

## 2018-02-15 NOTE — PROGRESS NOTE ADULT - PROBLEM SELECTOR PLAN 8
Pt with  urinary retention with chronic indwelling Martinez (changed last week by [facility])  Continue Martinez and Flomax  Urine cultures ESBL, pt asymptomatic, WBC wnl, afebrile, likely colonization. monitor off Abx. Monica ID and AND RN, on contact isolation according to [facility] infectious control policy.   Pt with hydrocele on CT- non tender

## 2018-02-15 NOTE — PROGRESS NOTE ADULT - ASSESSMENT
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       Patient is pending TTE to assess LV function and AS as per cardiology.      *SHARONA: etiology unclear, in setting of hypernatremia, suspect volume depletion, free water depletion  - hold Lasix for now  - encourage oral hydration  - trend renal function      *Hypernatremia: possibly 2/2 Lasix, poor PO intake of water  - liberate water intake  - encourage oral hydration  - D5W at 50 cc/hr for now  - repeat Na in afternoon  - monitor      *Thrombocytopenia: etiology unclear, appears to be chronic  - trend

## 2018-02-15 NOTE — PROGRESS NOTE ADULT - SUBJECTIVE AND OBJECTIVE BOX
Cardiovascular Disease Progress Note    Overnight events: No acute events overnight. Mr. Kinsey denies chest pain or SOB.    Otherwise review of systems negative    Objective Findings:  T(C): 36.8 (02-15-18 @ 06:59), Max: 37.1 (02-14-18 @ 12:46)  HR: 95 (02-15-18 @ 06:59) (75 - 95)  BP: 137/61 (02-15-18 @ 06:59) (137/61 - 137/76)  RR: 18 (02-15-18 @ 06:59) (18 - 18)  SpO2: 96% (02-15-18 @ 06:59) (96% - 96%)  Wt(kg): --  Daily     Daily       Physical Exam:  Gen: NAD  HEENT: EOMI  CV: IIR, normal S1 + S2, no m/r/g  Lungs: CTAB  Abd: soft, non-tender  Ext: No edema    Telemetry: A-fib 90s    Laboratory Data:                        14.0   9.87  )-----------( 83       ( 14 Feb 2018 07:31 )             45.3     02-14    147<H>  |  101  |  50<H>  ----------------------------<  128<H>  3.7   |  33<H>  |  1.32<H>    Ca    9.0      14 Feb 2018 15:30  Mg     2.4     02-14                Inpatient Medications:  MEDICATIONS  (STANDING):  ascorbic acid 500 milliGRAM(s) Oral daily  aspirin  chewable 81 milliGRAM(s) Oral daily  brimonidine 0.2% Ophthalmic Solution 1 Drop(s) Both EYES two times a day  cholecalciferol 1000 Unit(s) Oral daily  diVALproex  milliGRAM(s) Oral three times a day  dorzolamide 2%/timolol 0.5% Ophthalmic Solution 1 Drop(s) Both EYES two times a day  gabapentin 300 milliGRAM(s) Oral three times a day  latanoprost 0.005% Ophthalmic Solution 1 Drop(s) Both EYES at bedtime  levothyroxine 112 MICROGram(s) Oral daily  metoprolol     tartrate 25 milliGRAM(s) Oral two times a day  multivitamin 1 Tablet(s) Oral daily  rivastigmine 4.5 milliGRAM(s) Oral two times a day  tamsulosin 0.4 milliGRAM(s) Oral at bedtime      Assessment: 86 year old man with moderate AS, HTN, and PAF presents with ADHF    Plan of Care:    #Chronic diastolic CHF-  Hold Lasix today given elevated creatinine with rising BUN.   Possibly secondary to decreased PO intake.   Awaiting TTE to assess AV area and LVEF.     #Paroxysmal a-fib- currently in rate controlled a-fib.  No AC due to h/o intracranial hemorrhage.     #HTN- BP acceptable on current regimen.    Will follow with you.     Over 35 minutes spent on total encounter; more than 50% of the visit was spent counseling and/or coordinating care by the attending physician.      Mohsen Whitmore MD [facility]  Cardiovascular Disease  [phone]

## 2018-02-16 VITALS — WEIGHT: 225.09 LBS

## 2018-02-16 LAB
BASOPHILS # BLD AUTO: 0.07 K/UL — SIGNIFICANT CHANGE UP (ref 0–0.2)
BASOPHILS NFR BLD AUTO: 0.7 % — SIGNIFICANT CHANGE UP (ref 0–2)
BUN SERPL-MCNC: 39 MG/DL — HIGH (ref 7–23)
CALCIUM SERPL-MCNC: 9.1 MG/DL — SIGNIFICANT CHANGE UP (ref 8.4–10.5)
CHLORIDE SERPL-SCNC: 104 MMOL/L — SIGNIFICANT CHANGE UP (ref 98–107)
CO2 SERPL-SCNC: 31 MMOL/L — SIGNIFICANT CHANGE UP (ref 22–31)
CREAT SERPL-MCNC: 0.91 MG/DL — SIGNIFICANT CHANGE UP (ref 0.5–1.3)
EOSINOPHIL # BLD AUTO: 0.17 K/UL — SIGNIFICANT CHANGE UP (ref 0–0.5)
EOSINOPHIL NFR BLD AUTO: 1.7 % — SIGNIFICANT CHANGE UP (ref 0–6)
GLUCOSE SERPL-MCNC: 144 MG/DL — HIGH (ref 70–99)
HCT VFR BLD CALC: 47.6 % — SIGNIFICANT CHANGE UP (ref 39–50)
HGB BLD-MCNC: 14.6 G/DL — SIGNIFICANT CHANGE UP (ref 13–17)
IMM GRANULOCYTES # BLD AUTO: 0.08 # — SIGNIFICANT CHANGE UP
IMM GRANULOCYTES NFR BLD AUTO: 0.8 % — SIGNIFICANT CHANGE UP (ref 0–1.5)
LYMPHOCYTES # BLD AUTO: 2.37 K/UL — SIGNIFICANT CHANGE UP (ref 1–3.3)
LYMPHOCYTES # BLD AUTO: 23.3 % — SIGNIFICANT CHANGE UP (ref 13–44)
MAGNESIUM SERPL-MCNC: 2.3 MG/DL — SIGNIFICANT CHANGE UP (ref 1.6–2.6)
MCHC RBC-ENTMCNC: 29.6 PG — SIGNIFICANT CHANGE UP (ref 27–34)
MCHC RBC-ENTMCNC: 30.7 % — LOW (ref 32–36)
MCV RBC AUTO: 96.4 FL — SIGNIFICANT CHANGE UP (ref 80–100)
MONOCYTES # BLD AUTO: 1.63 K/UL — HIGH (ref 0–0.9)
MONOCYTES NFR BLD AUTO: 16 % — HIGH (ref 2–14)
NEUTROPHILS # BLD AUTO: 5.84 K/UL — SIGNIFICANT CHANGE UP (ref 1.8–7.4)
NEUTROPHILS NFR BLD AUTO: 57.5 % — SIGNIFICANT CHANGE UP (ref 43–77)
NRBC # FLD: 0 — SIGNIFICANT CHANGE UP
PLATELET # BLD AUTO: 79 K/UL — LOW (ref 150–400)
PMV BLD: 10.7 FL — SIGNIFICANT CHANGE UP (ref 7–13)
POTASSIUM SERPL-MCNC: 3.5 MMOL/L — SIGNIFICANT CHANGE UP (ref 3.5–5.3)
POTASSIUM SERPL-SCNC: 3.5 MMOL/L — SIGNIFICANT CHANGE UP (ref 3.5–5.3)
RBC # BLD: 4.94 M/UL — SIGNIFICANT CHANGE UP (ref 4.2–5.8)
RBC # FLD: 14.9 % — HIGH (ref 10.3–14.5)
SODIUM SERPL-SCNC: 147 MMOL/L — HIGH (ref 135–145)
WBC # BLD: 10.16 K/UL — SIGNIFICANT CHANGE UP (ref 3.8–10.5)
WBC # FLD AUTO: 10.16 K/UL — SIGNIFICANT CHANGE UP (ref 3.8–10.5)

## 2018-02-16 PROCEDURE — 99239 HOSP IP/OBS DSCHRG MGMT >30: CPT

## 2018-02-16 RX ORDER — FUROSEMIDE 40 MG
1 TABLET ORAL
Qty: 0 | Refills: 0 | COMMUNITY

## 2018-02-16 RX ORDER — FUROSEMIDE 40 MG
1 TABLET ORAL
Qty: 30 | Refills: 0 | OUTPATIENT
Start: 2018-02-16 | End: 2018-03-17

## 2018-02-16 RX ADMIN — Medication 1 TABLET(S): at 11:41

## 2018-02-16 RX ADMIN — Medication 500 MILLIGRAM(S): at 11:41

## 2018-02-16 RX ADMIN — RIVASTIGMINE 4.5 MILLIGRAM(S): 4.6 PATCH, EXTENDED RELEASE TRANSDERMAL at 05:26

## 2018-02-16 RX ADMIN — DIVALPROEX SODIUM 500 MILLIGRAM(S): 500 TABLET, DELAYED RELEASE ORAL at 11:41

## 2018-02-16 RX ADMIN — Medication 40 MILLIGRAM(S): at 05:26

## 2018-02-16 RX ADMIN — GABAPENTIN 300 MILLIGRAM(S): 400 CAPSULE ORAL at 05:26

## 2018-02-16 RX ADMIN — Medication 112 MICROGRAM(S): at 05:26

## 2018-02-16 RX ADMIN — DORZOLAMIDE HYDROCHLORIDE TIMOLOL MALEATE 1 DROP(S): 20; 5 SOLUTION/ DROPS OPHTHALMIC at 05:25

## 2018-02-16 RX ADMIN — Medication 25 MILLIGRAM(S): at 05:26

## 2018-02-16 RX ADMIN — Medication 1000 UNIT(S): at 11:41

## 2018-02-16 RX ADMIN — Medication 81 MILLIGRAM(S): at 11:41

## 2018-02-16 RX ADMIN — DIVALPROEX SODIUM 500 MILLIGRAM(S): 500 TABLET, DELAYED RELEASE ORAL at 05:26

## 2018-02-16 RX ADMIN — SODIUM CHLORIDE 50 MILLILITER(S): 9 INJECTION, SOLUTION INTRAVENOUS at 11:42

## 2018-02-16 RX ADMIN — GABAPENTIN 300 MILLIGRAM(S): 400 CAPSULE ORAL at 11:41

## 2018-02-16 RX ADMIN — BRIMONIDINE TARTRATE 1 DROP(S): 2 SOLUTION/ DROPS OPHTHALMIC at 05:25

## 2018-02-16 NOTE — PROGRESS NOTE ADULT - ASSESSMENT
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       *SHARONA: resolved      *Hypernatremia: improved. Likely 2/2 decreased water intake  - encourage oral water intake - discussed with family      *Thrombocytopenia: etiology unclear, appears to be chronic, improved today

## 2018-02-16 NOTE — PROGRESS NOTE ADULT - SUBJECTIVE AND OBJECTIVE BOX
CHIEF COMPLAINT: Patient is a 86y old  male who presents with a chief complaint of Shortness of breath (13 Feb 2018 14:24)      SUBJECTIVE / OVERNIGHT EVENTS:    No complaints. Worked with PT. Denies abdominal pain. Denies SOB.    Called patient's daughter Christina. Long conversation had regarding patient's hospice course, plan going forward. Patient has physician who does home visits - discussed should continue diuretics and have home visiting doctor management the diuretics. Discussed should encourage oral water intake.    MEDICATIONS  (STANDING):  ascorbic acid 500 milliGRAM(s) Oral daily  aspirin  chewable 81 milliGRAM(s) Oral daily  brimonidine 0.2% Ophthalmic Solution 1 Drop(s) Both EYES two times a day  cholecalciferol 1000 Unit(s) Oral daily  dextrose 5%. 1000 milliLiter(s) (50 mL/Hr) IV Continuous <Continuous>  diVALproex  milliGRAM(s) Oral three times a day  dorzolamide 2%/timolol 0.5% Ophthalmic Solution 1 Drop(s) Both EYES two times a day  furosemide    Tablet 40 milliGRAM(s) Oral daily  gabapentin 300 milliGRAM(s) Oral three times a day  latanoprost 0.005% Ophthalmic Solution 1 Drop(s) Both EYES at bedtime  levothyroxine 112 MICROGram(s) Oral daily  metoprolol     tartrate 25 milliGRAM(s) Oral two times a day  multivitamin 1 Tablet(s) Oral daily  rivastigmine 4.5 milliGRAM(s) Oral two times a day  tamsulosin 0.4 milliGRAM(s) Oral at bedtime    MEDICATIONS  (PRN):      VITALS:  T(F): 97.9 (02-16-18 @ 05:23), Max: 98 (02-15-18 @ 19:41)  HR: 89 (02-16-18 @ 05:23) (85 - 89)  BP: 129/75 (02-16-18 @ 05:23) (127/66 - 131/71)  RR: 19 (02-16-18 @ 05:23) (19 - 20)  SpO2: 97% (02-16-18 @ 05:23)      CAPILLARY BLOOD GLUCOSE    Output     I&O's Summary  T(F): 97.9 (02-16-18 @ 05:23), Max: 98 (02-15-18 @ 19:41)  HR: 89 (02-16-18 @ 05:23) (85 - 89)  BP: 129/75 (02-16-18 @ 05:23) (127/66 - 131/71)  RR: 19 (02-16-18 @ 05:23) (19 - 20)  SpO2: 97% (02-16-18 @ 05:23)    Sat 96% on RA    PHYSICAL EXAM:  GENERAL: obese man lying in bed, NAD  HEAD:  Atraumatic, Normocephalic  EYES: EOMI  NECK: Supple, No JVD  CHEST/LUNG: nonlabored breathing  HEART: nl S1/S2  ABDOMEN: nondistended, soft  EXTREMITIES:  no LE edema  NEUROLOGY: non-focal  SKIN: No rashes noted    LABS:              14.6                 147  | 31   | 39           10.16 >-----------< 79      ------------------------< 144                   47.6                 3.5  | 104  | 0.91                                         Ca 9.1   Mg 2.3   Ph x                          MICROBIOLOGY:        RADIOLOGY & ADDITIONAL TESTS:    Imaging Personally Reviewed:    < from: TTE with Doppler (w/Cont) (02.15.18 @ 16:22) >  ------------------------------------------------------------------------  CONCLUSIONS:  1. Calcified trileaflet aortic valve with decreased  opening. Peak transaortic valve gradient equals 33 mm Hg,  mean transaortic valve gradient equals 183 mm Hg, estimated  aortic valve area equals 0.6 sqcm (by continuity equation),  consistent with severe aortic stenosis.  2. Endocardium not well visualized; grossly normal left  ventricular systolic function. Endocardial visualization  enhanced with intravenous injection of echo contrast  (Definity).  3. The right ventricle is not well visualized; grossly  normal right ventricular systolic function.  *** Compared with echocardiogram of 5/5/2016, the aortic  stenosis has worsened.    < end of copied text >      [ ] Consultant(s) Notes Reviewed:  [ ] Care Discussed with Consultants/Other Providers:

## 2018-02-16 NOTE — PROGRESS NOTE ADULT - PROBLEM SELECTOR PLAN 9
ASA only given history of ICH  No further DVT ppx      Dispo: medically stable for d/c home today with home care, has visiting doctor who can f/u with patient at home, discussed plan with daughter Christina who is in agreement    >30 min spent in coordinating d/c of this patient

## 2018-02-16 NOTE — PROGRESS NOTE ADULT - SUBJECTIVE AND OBJECTIVE BOX
Cardiovascular Disease Progress Note    Overnight events: No acute events overnight. Mr. Kinsey denies chest pain or SOB.   Otherwise review of systems negative    Objective Findings:  T(C): 36.6 (18 @ 05:23), Max: 36.7 (02-15-18 @ 19:41)  HR: 89 (18 @ 05:23) (79 - 89)  BP: 129/75 (18 @ 05:23) (103/68 - 131/71)  RR: 19 (18 @ 05:23) (17 - 20)  SpO2: 97% (18 @ 05:23) (95% - 97%)  Wt(kg): --  Daily     Daily Weight in k.1 (2018 07:06)      Physical Exam:  Gen: NAD  HEENT: EOMI  CV: IIR, normal S1 + S2  Lungs: CTAB  Abd: soft, non-tender  Ext: No edema    Telemetry: A-fib    Laboratory Data:                        14.6   10. )-----------( 79       ( 2018 06:00 )             47.6     -    147<H>  |  104  |  39<H>  ----------------------------<  144<H>  3.5   |  31  |  0.91    Ca    9.1      2018 06:00  Mg     2.3     02-                Inpatient Medications:  MEDICATIONS  (STANDING):  ascorbic acid 500 milliGRAM(s) Oral daily  aspirin  chewable 81 milliGRAM(s) Oral daily  brimonidine 0.2% Ophthalmic Solution 1 Drop(s) Both EYES two times a day  cholecalciferol 1000 Unit(s) Oral daily  dextrose 5%. 1000 milliLiter(s) (50 mL/Hr) IV Continuous <Continuous>  diVALproex  milliGRAM(s) Oral three times a day  dorzolamide 2%/timolol 0.5% Ophthalmic Solution 1 Drop(s) Both EYES two times a day  furosemide    Tablet 40 milliGRAM(s) Oral daily  gabapentin 300 milliGRAM(s) Oral three times a day  latanoprost 0.005% Ophthalmic Solution 1 Drop(s) Both EYES at bedtime  levothyroxine 112 MICROGram(s) Oral daily  metoprolol     tartrate 25 milliGRAM(s) Oral two times a day  multivitamin 1 Tablet(s) Oral daily  rivastigmine 4.5 milliGRAM(s) Oral two times a day  tamsulosin 0.4 milliGRAM(s) Oral at bedtime      Assessment: 86 year old man with moderate AS, HTN, and PAF presents with ADHF    Plan of Care:    #Severe Aortic Stenosis-  I spoke to patient's family.   They are wishing for conservative management, which I agree with in light of his co-morbidities.  Continue current cardiac management.      #Chronic diastolic CHF-  Hold Lasix today given elevated creatinine with rising BUN.   Possibly secondary to decreased PO intake.     #Paroxysmal a-fib- currently in rate controlled a-fib.  No AC due to h/o intracranial hemorrhage.     #HTN- BP acceptable on current regimen.    Will follow with you.   Care discussed at length with patient's family.     Over 35 minutes spent on total encounter; more than 50% of the visit was spent counseling and/or coordinating care by the attending physician.      Mohsen Whitmore MD [facility]  Cardiovascular Disease  [phone]

## 2018-02-16 NOTE — PROGRESS NOTE ADULT - PROVIDER SPECIALTY LIST ADULT
Cardiology
Hospitalist
Internal Medicine

## 2018-02-16 NOTE — PROGRESS NOTE ADULT - PROBLEM SELECTOR PLAN 4
repeat TTE with severe AS  appreciate cardiology recs  conservative management  diuretics per cardiology

## 2018-02-16 NOTE — PROGRESS NOTE ADULT - PROBLEM SELECTOR PLAN 1
likely in the setting of aortic stenosis  TTE from 2016 shows normal LV function ,moderate aortic stenosis, pul HTN  Cardiology Dr Myranda cormier appreciated.  Diuretics changed to PO  Breathing comfortably on NC

## 2018-02-16 NOTE — PROGRESS NOTE ADULT - PROBLEM SELECTOR PROBLEM 1
Acute on chronic diastolic (congestive) heart failure

## 2018-02-16 NOTE — PROGRESS NOTE ADULT - PROBLEM SELECTOR PLAN 8
Pt with  urinary retention with chronic indwelling Martinez (changed last week by [facility])  Continue Martinez and Flomax  Urine cultures ESBL, pt asymptomatic, WBC wnl, afebrile, likely colonization. monitor off Abx. Monica ID and AND RN, on contact isolation according to [facility] infectious control policy.   Pt with hydrocele on CT- non tender

## 2018-02-21 VITALS
TEMPERATURE: 97.2 F | HEART RATE: 68 BPM | RESPIRATION RATE: 12 BRPM | SYSTOLIC BLOOD PRESSURE: 82 MMHG | DIASTOLIC BLOOD PRESSURE: 50 MMHG

## 2018-02-22 ENCOUNTER — MEDICATION RENEWAL (OUTPATIENT)
Age: 83
End: 2018-02-22

## 2018-02-22 DIAGNOSIS — Z00.00 ENCOUNTER FOR GENERAL ADULT MEDICAL EXAMINATION W/OUT ABNORMAL FINDINGS: ICD-10-CM

## 2018-02-22 DIAGNOSIS — I61.9 NONTRAUMATIC INTRACEREBRAL HEMORRHAGE, UNSPECIFIED: ICD-10-CM

## 2018-02-22 RX ORDER — POTASSIUM CHLORIDE 1.5 G/1.58G
20 POWDER, FOR SOLUTION ORAL DAILY
Qty: 30 | Refills: 2 | Status: ACTIVE | COMMUNITY
Start: 2017-06-06

## 2018-02-22 RX ORDER — DIAZEPAM 5 MG/1
5 TABLET ORAL
Qty: 10 | Refills: 0 | Status: ACTIVE | COMMUNITY
Start: 2017-08-21

## 2018-02-22 RX ORDER — STARCH
POWDER (GRAM) ORAL
Qty: 1 | Refills: 5 | Status: ACTIVE | COMMUNITY
Start: 2017-06-06

## 2018-02-22 RX ORDER — DOCUSATE SODIUM 100 MG/1
100 CAPSULE ORAL
Qty: 30 | Refills: 0 | Status: ACTIVE | COMMUNITY
Start: 2017-03-30

## 2018-02-22 RX ORDER — ASCORBIC ACID 500 MG
500 TABLET ORAL DAILY
Qty: 90 | Refills: 3 | Status: ACTIVE | COMMUNITY
Start: 2018-02-22

## 2018-02-23 ENCOUNTER — CLINICAL ADVICE (OUTPATIENT)
Age: 83
End: 2018-02-23

## 2018-02-23 DIAGNOSIS — Z51.5 ENCOUNTER FOR PALLIATIVE CARE: ICD-10-CM

## 2018-02-23 RX ORDER — LORAZEPAM 2 MG/ML
2 CONCENTRATE ORAL
Qty: 30 | Refills: 0 | Status: ACTIVE | COMMUNITY
Start: 2018-02-23 | End: 1900-01-01

## 2018-02-23 RX ORDER — MORPHINE SULFATE 100 MG/5ML
100 SOLUTION ORAL
Qty: 30 | Refills: 0 | Status: ACTIVE | COMMUNITY
Start: 2018-02-23 | End: 1900-01-01

## 2018-02-23 RX ORDER — ATROPINE SULFATE 10 MG/ML
1 SOLUTION OPHTHALMIC EVERY 4 HOURS
Qty: 2 | Refills: 2 | Status: ACTIVE | COMMUNITY
Start: 2018-02-23 | End: 1900-01-01

## 2018-02-23 RX ORDER — ACETAMINOPHEN 650 MG/1
650 SUPPOSITORY RECTAL
Qty: 6 | Refills: 6 | Status: ACTIVE | COMMUNITY
Start: 2018-02-23 | End: 1900-01-01

## 2019-12-27 NOTE — ED ADULT NURSE NOTE - PRIMARY CARE PROVIDER
Spoke with Diaz Dykes and reviewed what patient is to be taking. Per Arabella Meek, NP she can take both the diltiazem and the metoprolol succinate, but only 100mg daily. Verbalized understanding. MD jose MD Ureña

## 2019-12-27 NOTE — PROGRESS NOTE ADULT - ASSESSMENT
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 n/a

## 2020-04-02 PROBLEM — Z51.5 HOSPICE CARE PATIENT: Status: ACTIVE | Noted: 2018-02-23

## 2020-07-20 NOTE — DIETITIAN INITIAL EVALUATION ADULT. - PROBLEM/PLAN-5
Nursing Note:  Lowell Arredondo presents today for Port labs.    Patient seen by provider today: No   present during visit today: Not Applicable.    Note: Sent to CT with port accessed. Returned for de-access.    Intravenous Access:  Labs drawn without difficulty.  Implanted Port.    Discharge Plan:   Patient was sent home. Alone, ambulatory.   Next appointment is MD nathan/mariaan on 7/23.  Precious Lazo RN         DISPLAY PLAN FREE TEXT

## 2020-10-22 ENCOUNTER — TRANSCRIPTION ENCOUNTER (OUTPATIENT)
Age: 85
End: 2020-10-22

## 2022-01-14 NOTE — ED ADULT NURSE NOTE - NSSISCREENINGQ2_ED_A_ED
From: Wilbur Chiu  To: Lara Tony  Sent: 1/14/2022 10:31 AM CST  Subject: Follow Up/Question    Tanner Bermudez.    Just wanted to follow up with you since I last saw you. You had requested two tests for me that I have yet to be contacted about (I know you said things were backed up and of course with the state of healthcare now I’m sure neither are a priority on the list):    Colonoscopy (never heard to schedule)  Prostate blood test (never heard to schedule)    I can continue to wait of course. But just wanted to make sure I shouldn’t be doing something proactively.    Also, I think I mentioned we got a puppy a few months ago. For the first time in my life I’m allergic to a dog (I know it’s an allergy as I sneeze, cough, runny nose when I’m in common areas the dog is in, but leaving the house the symptoms disappear). I know I’m not supposed to take benedryl with my meds and HBP. So is there something I could do to minimize the issue (plus we are heading to my sister in laws with a cat and i usually do a week prior and during benedryl cycle so I want to get ahead of that as well).    No hurry. Just wanted to check in.    Thanks Lara!    Marcial Chiu  [phone]  [email or web address]   No

## 2022-12-22 NOTE — H&P ADULT - NSHPLABSRESULTS_GEN_ALL_CORE
Patient/Caregiver requests family/friend to interpret. May 2016, Echo: Moderate aortic stenosis. Grossly normal left ventricular systolic function.  ----------  EKG: NSR at 77 bpm with sinus arrhythmia at 77 bpm with sinus arrhythmia, RBBB, LAD  CE x1: Negative  WBC: 11.93  Platelets: 105  Glucose: 164  AST: 70  ProBNP: 2159  UA: Moderate blood, large LE  RVP: Negative    2/8 CXR:  Limited study suggesting mild interstitial edema.  2/8 CT pelvis: No abscess or gas collection in the pelvis. Bilateral hydroceles.

## 2023-03-08 NOTE — PROGRESS NOTE ADULT - PROBLEM SELECTOR PLAN 9
ASA only given history of ICH  No further DVT ppx Hydroxychloroquine Pregnancy And Lactation Text: This medication has been shown to cause fetal harm but it isn't assigned a Pregnancy Risk Category. There are small amounts excreted in breast milk.

## 2023-07-24 NOTE — PROGRESS NOTE ADULT - PROBLEM SELECTOR PLAN 9
2.2 ASA only given history of ICH  No further DVT ppx      Dispo: medically stable for d/c, pending TTE  - will need close cardiology f/u given CHF exacerbation    >30 min spent in coordinating d/c of this patient

## 2023-08-28 NOTE — DISCHARGE NOTE ADULT - NS MD DC FALL RISK RISK
Quality 226: Preventive Care And Screening: Tobacco Use: Screening And Cessation Intervention: Patient screened for tobacco use and is an ex/non-smoker
Quality 130: Documentation Of Current Medications In The Medical Record: Current Medications Documented
Detail Level: Detailed
For information on Fall & Injury Prevention, visit [email or web address]

## 2023-09-01 NOTE — PROGRESS NOTE ADULT - PROBLEM SELECTOR PROBLEM 6
Faxed ok for cl refill to [phone]   Hypothyroidism Composite Graft Text: The defect edges were debeveled with a #15 scalpel blade.  Given the location of the defect, shape of the defect, the proximity to free margins and the fact the defect was full thickness a composite graft was deemed most appropriate.  The defect was outline and then transferred to the donor site.  A full thickness graft was then excised from the donor site. The graft was then placed in the primary defect, oriented appropriately and then sutured into place.  The secondary defect was then repaired using a primary closure.

## 2025-01-14 NOTE — ED ADULT NURSE NOTE - NS ED NOTE ABUSE SUSPICION NEGLECT YN
Pt getting admitted today for FLAG-SERA chemo. Report called to ALDO Roman 
No